# Patient Record
Sex: MALE | Race: WHITE | Employment: FULL TIME | URBAN - NONMETROPOLITAN AREA
[De-identification: names, ages, dates, MRNs, and addresses within clinical notes are randomized per-mention and may not be internally consistent; named-entity substitution may affect disease eponyms.]

---

## 2018-01-20 ENCOUNTER — ANESTHESIA (OUTPATIENT)
Dept: OPERATING ROOM | Age: 38
End: 2018-01-20
Payer: COMMERCIAL

## 2018-01-20 ENCOUNTER — ANESTHESIA EVENT (OUTPATIENT)
Dept: OPERATING ROOM | Age: 38
End: 2018-01-20
Payer: COMMERCIAL

## 2018-01-20 ENCOUNTER — HOSPITAL ENCOUNTER (OUTPATIENT)
Age: 38
Setting detail: OBSERVATION
Discharge: HOME | End: 2018-01-20
Attending: EMERGENCY MEDICINE | Admitting: SURGERY
Payer: COMMERCIAL

## 2018-01-20 ENCOUNTER — APPOINTMENT (OUTPATIENT)
Dept: CT IMAGING | Age: 38
End: 2018-01-20
Payer: COMMERCIAL

## 2018-01-20 VITALS
HEIGHT: 68 IN | OXYGEN SATURATION: 95 % | HEART RATE: 75 BPM | DIASTOLIC BLOOD PRESSURE: 61 MMHG | RESPIRATION RATE: 18 BRPM | SYSTOLIC BLOOD PRESSURE: 102 MMHG | BODY MASS INDEX: 26.52 KG/M2 | TEMPERATURE: 98.6 F | WEIGHT: 175 LBS

## 2018-01-20 VITALS
OXYGEN SATURATION: 94 % | SYSTOLIC BLOOD PRESSURE: 137 MMHG | DIASTOLIC BLOOD PRESSURE: 77 MMHG | RESPIRATION RATE: 6 BRPM

## 2018-01-20 DIAGNOSIS — K35.30 ACUTE APPENDICITIS WITH LOCALIZED PERITONITIS: Primary | ICD-10-CM

## 2018-01-20 PROBLEM — K35.80 ACUTE APPENDICITIS: Status: ACTIVE | Noted: 2018-01-20

## 2018-01-20 LAB
ALBUMIN SERPL-MCNC: 4.4 G/DL (ref 3.5–5.1)
ALP BLD-CCNC: 78 U/L (ref 38–126)
ALT SERPL-CCNC: 43 U/L (ref 11–66)
ANION GAP SERPL CALCULATED.3IONS-SCNC: 16 MEQ/L (ref 8–16)
AST SERPL-CCNC: 21 U/L (ref 5–40)
BACTERIA: NORMAL
BASOPHILS # BLD: 0 %
BASOPHILS ABSOLUTE: 0 THOU/MM3 (ref 0–0.1)
BILIRUB SERPL-MCNC: 1.1 MG/DL (ref 0.3–1.2)
BILIRUBIN URINE: NEGATIVE
BLOOD, URINE: NEGATIVE
BUN BLDV-MCNC: 10 MG/DL (ref 7–22)
CALCIUM SERPL-MCNC: 9.3 MG/DL (ref 8.5–10.5)
CASTS: NORMAL /LPF
CASTS: NORMAL /LPF
CHARACTER, URINE: CLEAR
CHLORIDE BLD-SCNC: 102 MEQ/L (ref 98–111)
CO2: 23 MEQ/L (ref 23–33)
COLOR: YELLOW
CREAT SERPL-MCNC: 1.1 MG/DL (ref 0.4–1.2)
CRYSTALS: NORMAL
EOSINOPHIL # BLD: 0.3 %
EOSINOPHILS ABSOLUTE: 0 THOU/MM3 (ref 0–0.4)
EPITHELIAL CELLS, UA: NORMAL /HPF
GFR SERPL CREATININE-BSD FRML MDRD: 74 ML/MIN/1.73M2
GLUCOSE BLD-MCNC: 141 MG/DL (ref 70–108)
GLUCOSE, URINE: NEGATIVE MG/DL
HCT VFR BLD CALC: 46.6 % (ref 42–52)
HEMOGLOBIN: 15.4 GM/DL (ref 14–18)
KETONES, URINE: NEGATIVE
LEUKOCYTE ESTERASE, URINE: NEGATIVE
LIPASE: 23.8 U/L (ref 5.6–51.3)
LYMPHOCYTES # BLD: 6.7 %
LYMPHOCYTES ABSOLUTE: 1.1 THOU/MM3 (ref 1–4.8)
MCH RBC QN AUTO: 30.1 PG (ref 27–31)
MCHC RBC AUTO-ENTMCNC: 33.1 GM/DL (ref 33–37)
MCV RBC AUTO: 91.2 FL (ref 80–94)
MISCELLANEOUS LAB TEST RESULT: NORMAL
MONOCYTES # BLD: 3.5 %
MONOCYTES ABSOLUTE: 0.5 THOU/MM3 (ref 0.4–1.3)
NITRITE, URINE: NEGATIVE
NUCLEATED RED BLOOD CELLS: 0 /100 WBC
OSMOLALITY CALCULATION: 282.7 MOSMOL/KG (ref 275–300)
PDW BLD-RTO: 14.1 % (ref 11.5–14.5)
PH UA: 5.5
PLATELET # BLD: 212 THOU/MM3 (ref 130–400)
PMV BLD AUTO: 9.7 MCM (ref 7.4–10.4)
POTASSIUM SERPL-SCNC: 4.4 MEQ/L (ref 3.5–5.2)
PROTEIN UA: NEGATIVE MG/DL
RBC # BLD: 5.11 MILL/MM3 (ref 4.7–6.1)
RBC URINE: NORMAL /HPF
RENAL EPITHELIAL, UA: NORMAL
SEG NEUTROPHILS: 89.5 %
SEGMENTED NEUTROPHILS ABSOLUTE COUNT: 14.1 THOU/MM3 (ref 1.8–7.7)
SODIUM BLD-SCNC: 141 MEQ/L (ref 135–145)
SPECIFIC GRAVITY UA: 1.03 (ref 1–1.03)
TOTAL PROTEIN: 6.9 G/DL (ref 6.1–8)
UROBILINOGEN, URINE: 0.2 EU/DL
WBC # BLD: 15.7 THOU/MM3 (ref 4.8–10.8)
WBC UA: NORMAL /HPF
YEAST: NORMAL

## 2018-01-20 PROCEDURE — G0378 HOSPITAL OBSERVATION PER HR: HCPCS

## 2018-01-20 PROCEDURE — 3700000000 HC ANESTHESIA ATTENDED CARE: Performed by: SURGERY

## 2018-01-20 PROCEDURE — 2580000003 HC RX 258: Performed by: EMERGENCY MEDICINE

## 2018-01-20 PROCEDURE — 96376 TX/PRO/DX INJ SAME DRUG ADON: CPT

## 2018-01-20 PROCEDURE — 80053 COMPREHEN METABOLIC PANEL: CPT

## 2018-01-20 PROCEDURE — 6360000002 HC RX W HCPCS: Performed by: EMERGENCY MEDICINE

## 2018-01-20 PROCEDURE — 3600000013 HC SURGERY LEVEL 3 ADDTL 15MIN: Performed by: SURGERY

## 2018-01-20 PROCEDURE — 7100000000 HC PACU RECOVERY - FIRST 15 MIN: Performed by: SURGERY

## 2018-01-20 PROCEDURE — 85025 COMPLETE CBC W/AUTO DIFF WBC: CPT

## 2018-01-20 PROCEDURE — 6370000000 HC RX 637 (ALT 250 FOR IP): Performed by: SURGERY

## 2018-01-20 PROCEDURE — 2580000003 HC RX 258: Performed by: SURGERY

## 2018-01-20 PROCEDURE — 88304 TISSUE EXAM BY PATHOLOGIST: CPT

## 2018-01-20 PROCEDURE — 44970 LAPAROSCOPY APPENDECTOMY: CPT | Performed by: SURGERY

## 2018-01-20 PROCEDURE — 96365 THER/PROPH/DIAG IV INF INIT: CPT

## 2018-01-20 PROCEDURE — 6360000002 HC RX W HCPCS: Performed by: SURGERY

## 2018-01-20 PROCEDURE — 7100000001 HC PACU RECOVERY - ADDTL 15 MIN: Performed by: SURGERY

## 2018-01-20 PROCEDURE — 81001 URINALYSIS AUTO W/SCOPE: CPT

## 2018-01-20 PROCEDURE — 2780000010 HC IMPLANT OTHER: Performed by: SURGERY

## 2018-01-20 PROCEDURE — 96375 TX/PRO/DX INJ NEW DRUG ADDON: CPT

## 2018-01-20 PROCEDURE — 6360000002 HC RX W HCPCS: Performed by: ANESTHESIOLOGY

## 2018-01-20 PROCEDURE — 99285 EMERGENCY DEPT VISIT HI MDM: CPT

## 2018-01-20 PROCEDURE — 2580000003 HC RX 258: Performed by: ANESTHESIOLOGY

## 2018-01-20 PROCEDURE — 83690 ASSAY OF LIPASE: CPT

## 2018-01-20 PROCEDURE — 3600000003 HC SURGERY LEVEL 3 BASE: Performed by: SURGERY

## 2018-01-20 PROCEDURE — 6360000004 HC RX CONTRAST MEDICATION: Performed by: EMERGENCY MEDICINE

## 2018-01-20 PROCEDURE — 2720000010 HC SURG SUPPLY STERILE: Performed by: SURGERY

## 2018-01-20 PROCEDURE — 2500000003 HC RX 250 WO HCPCS: Performed by: SURGERY

## 2018-01-20 PROCEDURE — 2500000003 HC RX 250 WO HCPCS: Performed by: ANESTHESIOLOGY

## 2018-01-20 PROCEDURE — 3700000001 HC ADD 15 MINUTES (ANESTHESIA): Performed by: SURGERY

## 2018-01-20 PROCEDURE — 99219 PR INITIAL OBSERVATION CARE/DAY 50 MINUTES: CPT | Performed by: SURGERY

## 2018-01-20 PROCEDURE — 74177 CT ABD & PELVIS W/CONTRAST: CPT

## 2018-01-20 PROCEDURE — 36415 COLL VENOUS BLD VENIPUNCTURE: CPT

## 2018-01-20 DEVICE — RELOAD STPL SZ 0 L45MM DIA3.5MM 0DEG STD REG TISS BLU TI: Type: IMPLANTABLE DEVICE | Status: FUNCTIONAL

## 2018-01-20 RX ORDER — SODIUM CHLORIDE 0.9 % (FLUSH) 0.9 %
10 SYRINGE (ML) INJECTION PRN
Status: DISCONTINUED | OUTPATIENT
Start: 2018-01-20 | End: 2018-01-20 | Stop reason: HOSPADM

## 2018-01-20 RX ORDER — SODIUM CHLORIDE 9 MG/ML
INJECTION, SOLUTION INTRAVENOUS CONTINUOUS
Status: DISCONTINUED | OUTPATIENT
Start: 2018-01-20 | End: 2018-01-20 | Stop reason: HOSPADM

## 2018-01-20 RX ORDER — BUPIVACAINE HYDROCHLORIDE 5 MG/ML
INJECTION, SOLUTION PERINEURAL PRN
Status: DISCONTINUED | OUTPATIENT
Start: 2018-01-20 | End: 2018-01-20 | Stop reason: HOSPADM

## 2018-01-20 RX ORDER — FENTANYL CITRATE 50 UG/ML
INJECTION, SOLUTION INTRAMUSCULAR; INTRAVENOUS PRN
Status: DISCONTINUED | OUTPATIENT
Start: 2018-01-20 | End: 2018-01-20 | Stop reason: SDUPTHER

## 2018-01-20 RX ORDER — PROPOFOL 10 MG/ML
INJECTION, EMULSION INTRAVENOUS PRN
Status: DISCONTINUED | OUTPATIENT
Start: 2018-01-20 | End: 2018-01-20 | Stop reason: SDUPTHER

## 2018-01-20 RX ORDER — PAROXETINE 10 MG/1
10 TABLET, FILM COATED ORAL EVERY MORNING
COMMUNITY
End: 2020-10-05

## 2018-01-20 RX ORDER — MORPHINE SULFATE 4 MG/ML
4 INJECTION, SOLUTION INTRAMUSCULAR; INTRAVENOUS
Status: DISCONTINUED | OUTPATIENT
Start: 2018-01-20 | End: 2018-01-20 | Stop reason: HOSPADM

## 2018-01-20 RX ORDER — HYDROCODONE BITARTRATE AND ACETAMINOPHEN 5; 325 MG/1; MG/1
1 TABLET ORAL EVERY 4 HOURS PRN
Status: DISCONTINUED | OUTPATIENT
Start: 2018-01-20 | End: 2018-01-20 | Stop reason: HOSPADM

## 2018-01-20 RX ORDER — MORPHINE SULFATE 10 MG/ML
INJECTION, SOLUTION INTRAMUSCULAR; INTRAVENOUS PRN
Status: DISCONTINUED | OUTPATIENT
Start: 2018-01-20 | End: 2018-01-20 | Stop reason: SDUPTHER

## 2018-01-20 RX ORDER — CEFAZOLIN SODIUM 1 G/3ML
INJECTION, POWDER, FOR SOLUTION INTRAMUSCULAR; INTRAVENOUS PRN
Status: DISCONTINUED | OUTPATIENT
Start: 2018-01-20 | End: 2018-01-20 | Stop reason: SDUPTHER

## 2018-01-20 RX ORDER — FENTANYL CITRATE 50 UG/ML
50 INJECTION, SOLUTION INTRAMUSCULAR; INTRAVENOUS EVERY 5 MIN PRN
Status: DISCONTINUED | OUTPATIENT
Start: 2018-01-20 | End: 2018-01-20 | Stop reason: HOSPADM

## 2018-01-20 RX ORDER — SUCCINYLCHOLINE CHLORIDE 20 MG/ML
INJECTION INTRAMUSCULAR; INTRAVENOUS PRN
Status: DISCONTINUED | OUTPATIENT
Start: 2018-01-20 | End: 2018-01-20 | Stop reason: SDUPTHER

## 2018-01-20 RX ORDER — NEOSTIGMINE METHYLSULFATE 1 MG/ML
INJECTION, SOLUTION INTRAVENOUS PRN
Status: DISCONTINUED | OUTPATIENT
Start: 2018-01-20 | End: 2018-01-20 | Stop reason: SDUPTHER

## 2018-01-20 RX ORDER — SODIUM CHLORIDE 0.9 % (FLUSH) 0.9 %
10 SYRINGE (ML) INJECTION EVERY 12 HOURS SCHEDULED
Status: DISCONTINUED | OUTPATIENT
Start: 2018-01-20 | End: 2018-01-20 | Stop reason: HOSPADM

## 2018-01-20 RX ORDER — KETOROLAC TROMETHAMINE 30 MG/ML
30 INJECTION, SOLUTION INTRAMUSCULAR; INTRAVENOUS EVERY 6 HOURS
Status: DISCONTINUED | OUTPATIENT
Start: 2018-01-20 | End: 2018-01-20 | Stop reason: HOSPADM

## 2018-01-20 RX ORDER — ONDANSETRON 2 MG/ML
INJECTION INTRAMUSCULAR; INTRAVENOUS PRN
Status: DISCONTINUED | OUTPATIENT
Start: 2018-01-20 | End: 2018-01-20 | Stop reason: SDUPTHER

## 2018-01-20 RX ORDER — MORPHINE SULFATE 2 MG/ML
2 INJECTION, SOLUTION INTRAMUSCULAR; INTRAVENOUS EVERY 5 MIN PRN
Status: DISCONTINUED | OUTPATIENT
Start: 2018-01-20 | End: 2018-01-20 | Stop reason: HOSPADM

## 2018-01-20 RX ORDER — LABETALOL HYDROCHLORIDE 5 MG/ML
10 INJECTION, SOLUTION INTRAVENOUS EVERY 10 MIN PRN
Status: DISCONTINUED | OUTPATIENT
Start: 2018-01-20 | End: 2018-01-20 | Stop reason: HOSPADM

## 2018-01-20 RX ORDER — ONDANSETRON 2 MG/ML
4 INJECTION INTRAMUSCULAR; INTRAVENOUS EVERY 6 HOURS PRN
Status: DISCONTINUED | OUTPATIENT
Start: 2018-01-20 | End: 2018-01-20 | Stop reason: HOSPADM

## 2018-01-20 RX ORDER — PAROXETINE HYDROCHLORIDE 20 MG/1
10 TABLET, FILM COATED ORAL EVERY MORNING
Status: DISCONTINUED | OUTPATIENT
Start: 2018-01-20 | End: 2018-01-20 | Stop reason: HOSPADM

## 2018-01-20 RX ORDER — ROCURONIUM BROMIDE 10 MG/ML
INJECTION, SOLUTION INTRAVENOUS PRN
Status: DISCONTINUED | OUTPATIENT
Start: 2018-01-20 | End: 2018-01-20 | Stop reason: SDUPTHER

## 2018-01-20 RX ORDER — SODIUM CHLORIDE 9 MG/ML
INJECTION, SOLUTION INTRAVENOUS CONTINUOUS
Status: DISCONTINUED | OUTPATIENT
Start: 2018-01-20 | End: 2018-01-20

## 2018-01-20 RX ORDER — HYDROCODONE BITARTRATE AND ACETAMINOPHEN 5; 325 MG/1; MG/1
1 TABLET ORAL EVERY 4 HOURS PRN
Qty: 42 TABLET | Refills: 0 | Status: SHIPPED | OUTPATIENT
Start: 2018-01-20 | End: 2018-01-27

## 2018-01-20 RX ORDER — GLYCOPYRROLATE 0.2 MG/ML
INJECTION INTRAMUSCULAR; INTRAVENOUS PRN
Status: DISCONTINUED | OUTPATIENT
Start: 2018-01-20 | End: 2018-01-20 | Stop reason: SDUPTHER

## 2018-01-20 RX ORDER — MORPHINE SULFATE 2 MG/ML
2 INJECTION, SOLUTION INTRAMUSCULAR; INTRAVENOUS
Status: DISCONTINUED | OUTPATIENT
Start: 2018-01-20 | End: 2018-01-20 | Stop reason: HOSPADM

## 2018-01-20 RX ORDER — HYDROCODONE BITARTRATE AND ACETAMINOPHEN 5; 325 MG/1; MG/1
2 TABLET ORAL EVERY 4 HOURS PRN
Status: DISCONTINUED | OUTPATIENT
Start: 2018-01-20 | End: 2018-01-20 | Stop reason: HOSPADM

## 2018-01-20 RX ORDER — MIDAZOLAM HYDROCHLORIDE 1 MG/ML
INJECTION INTRAMUSCULAR; INTRAVENOUS PRN
Status: DISCONTINUED | OUTPATIENT
Start: 2018-01-20 | End: 2018-01-20 | Stop reason: SDUPTHER

## 2018-01-20 RX ORDER — SODIUM CHLORIDE 9 MG/ML
INJECTION, SOLUTION INTRAVENOUS CONTINUOUS PRN
Status: DISCONTINUED | OUTPATIENT
Start: 2018-01-20 | End: 2018-01-20 | Stop reason: SDUPTHER

## 2018-01-20 RX ORDER — ACETAMINOPHEN 325 MG/1
650 TABLET ORAL EVERY 4 HOURS PRN
Status: DISCONTINUED | OUTPATIENT
Start: 2018-01-20 | End: 2018-01-20 | Stop reason: HOSPADM

## 2018-01-20 RX ADMIN — ONDANSETRON 4 MG: 2 INJECTION INTRAMUSCULAR; INTRAVENOUS at 06:02

## 2018-01-20 RX ADMIN — KETOROLAC TROMETHAMINE 30 MG: 30 INJECTION, SOLUTION INTRAMUSCULAR at 15:18

## 2018-01-20 RX ADMIN — SODIUM CHLORIDE: 9 INJECTION, SOLUTION INTRAVENOUS at 05:35

## 2018-01-20 RX ADMIN — MORPHINE SULFATE 10 MG: 10 INJECTION, SOLUTION INTRAMUSCULAR; INTRAVENOUS at 06:02

## 2018-01-20 RX ADMIN — CEFAZOLIN SODIUM 1000 MG: 1 INJECTION, POWDER, FOR SOLUTION INTRAMUSCULAR; INTRAVENOUS at 05:56

## 2018-01-20 RX ADMIN — IOPAMIDOL 80 ML: 755 INJECTION, SOLUTION INTRAVENOUS at 01:54

## 2018-01-20 RX ADMIN — SODIUM CHLORIDE: 9 INJECTION, SOLUTION INTRAVENOUS at 08:24

## 2018-01-20 RX ADMIN — NEOSTIGMINE METHYLSULFATE 3 MG: 1 INJECTION, SOLUTION INTRAVENOUS at 06:20

## 2018-01-20 RX ADMIN — Medication 3 G: at 02:50

## 2018-01-20 RX ADMIN — KETOROLAC TROMETHAMINE 30 MG: 30 INJECTION, SOLUTION INTRAMUSCULAR at 09:41

## 2018-01-20 RX ADMIN — CEFOXITIN 2 G: 2 INJECTION, POWDER, FOR SOLUTION INTRAVENOUS at 15:20

## 2018-01-20 RX ADMIN — MIDAZOLAM HYDROCHLORIDE 2 MG: 1 INJECTION, SOLUTION INTRAMUSCULAR; INTRAVENOUS at 05:45

## 2018-01-20 RX ADMIN — SODIUM CHLORIDE: 9 INJECTION, SOLUTION INTRAVENOUS at 04:56

## 2018-01-20 RX ADMIN — SUCCINYLCHOLINE CHLORIDE 140 MG: 20 INJECTION, SOLUTION INTRAMUSCULAR; INTRAVENOUS at 05:47

## 2018-01-20 RX ADMIN — FENTANYL CITRATE 50 MCG: 50 INJECTION, SOLUTION INTRAMUSCULAR; INTRAVENOUS at 07:00

## 2018-01-20 RX ADMIN — PROPOFOL 200 MG: 10 INJECTION, EMULSION INTRAVENOUS at 05:47

## 2018-01-20 RX ADMIN — CEFOXITIN 2 G: 2 INJECTION, POWDER, FOR SOLUTION INTRAVENOUS at 09:41

## 2018-01-20 RX ADMIN — GLYCOPYRROLATE 0.4 MG: 0.2 INJECTION, SOLUTION INTRAMUSCULAR; INTRAVENOUS at 06:20

## 2018-01-20 RX ADMIN — PAROXETINE HYDROCHLORIDE 10 MG: 20 TABLET, FILM COATED ORAL at 09:41

## 2018-01-20 RX ADMIN — FENTANYL CITRATE 100 MCG: 50 INJECTION INTRAMUSCULAR; INTRAVENOUS at 05:45

## 2018-01-20 RX ADMIN — GLYCOPYRROLATE 0.2 MG: 0.2 INJECTION, SOLUTION INTRAMUSCULAR; INTRAVENOUS at 05:45

## 2018-01-20 RX ADMIN — Medication 25 MG: at 05:54

## 2018-01-20 RX ADMIN — FENTANYL CITRATE 50 MCG: 50 INJECTION, SOLUTION INTRAMUSCULAR; INTRAVENOUS at 07:08

## 2018-01-20 ASSESSMENT — PULMONARY FUNCTION TESTS
PIF_VALUE: 26
PIF_VALUE: 6
PIF_VALUE: 11
PIF_VALUE: 2
PIF_VALUE: 16
PIF_VALUE: 2
PIF_VALUE: 3
PIF_VALUE: 19
PIF_VALUE: 13
PIF_VALUE: 12
PIF_VALUE: 33
PIF_VALUE: 18
PIF_VALUE: 13
PIF_VALUE: 31
PIF_VALUE: 19
PIF_VALUE: 12
PIF_VALUE: 4
PIF_VALUE: 1
PIF_VALUE: 19
PIF_VALUE: 13
PIF_VALUE: 7
PIF_VALUE: 3
PIF_VALUE: 14
PIF_VALUE: 16
PIF_VALUE: 15
PIF_VALUE: 1
PIF_VALUE: 1
PIF_VALUE: 18
PIF_VALUE: 14
PIF_VALUE: 12
PIF_VALUE: 11
PIF_VALUE: 24
PIF_VALUE: 10
PIF_VALUE: 19
PIF_VALUE: 3
PIF_VALUE: 13
PIF_VALUE: 5
PIF_VALUE: 14
PIF_VALUE: 12
PIF_VALUE: 11
PIF_VALUE: 26
PIF_VALUE: 19
PIF_VALUE: 1
PIF_VALUE: 14
PIF_VALUE: 28
PIF_VALUE: 25
PIF_VALUE: 19
PIF_VALUE: 12
PIF_VALUE: 11
PIF_VALUE: 15
PIF_VALUE: 20
PIF_VALUE: 19

## 2018-01-20 ASSESSMENT — ENCOUNTER SYMPTOMS
ABDOMINAL PAIN: 1
COUGH: 0
CHEST TIGHTNESS: 0
SINUS PRESSURE: 0
EYE PAIN: 0
DIARRHEA: 0
SORE THROAT: 0
CHOKING: 0
BACK PAIN: 1
EYE REDNESS: 0
SHORTNESS OF BREATH: 0
SINUS PAIN: 0
VOMITING: 0
CONSTIPATION: 0

## 2018-01-20 ASSESSMENT — PAIN DESCRIPTION - PAIN TYPE
TYPE: ACUTE PAIN;SURGICAL PAIN
TYPE: ACUTE PAIN
TYPE: ACUTE PAIN
TYPE: ACUTE PAIN;SURGICAL PAIN

## 2018-01-20 ASSESSMENT — PAIN DESCRIPTION - ORIENTATION
ORIENTATION: LOWER;MID
ORIENTATION: RIGHT;LEFT
ORIENTATION: LOWER
ORIENTATION: LOWER

## 2018-01-20 ASSESSMENT — PAIN DESCRIPTION - DESCRIPTORS
DESCRIPTORS: ACHING
DESCRIPTORS: ACHING
DESCRIPTORS: SHARP
DESCRIPTORS: SHARP

## 2018-01-20 ASSESSMENT — PAIN DESCRIPTION - FREQUENCY
FREQUENCY: CONTINUOUS
FREQUENCY: CONTINUOUS

## 2018-01-20 ASSESSMENT — PAIN SCALES - GENERAL
PAINLEVEL_OUTOF10: 7
PAINLEVEL_OUTOF10: 4
PAINLEVEL_OUTOF10: 6
PAINLEVEL_OUTOF10: 5
PAINLEVEL_OUTOF10: 5
PAINLEVEL_OUTOF10: 4
PAINLEVEL_OUTOF10: 5
PAINLEVEL_OUTOF10: 7

## 2018-01-20 ASSESSMENT — PAIN DESCRIPTION - LOCATION
LOCATION: ABDOMEN

## 2018-01-20 NOTE — H&P
[address] History and Physical Examination  Emily Carrasco MD     Pt Name: Liz Fam  MRN: [id number]  YOB: 1980  Date of evaluation: 1/20/2018  Primary Care Physician: Stuart Houser MD  Patient evaluated at the request of  Dr. Milady Salmeron  Reason for evaluation:abdominal pain  IMPRESSIONS:   1. Acute appendicitis  2. Leukocytosis secondary to #1  3. Incarcerated umbilical hernia  PLANS:   1. Recommend laparoscopic appendectomy And repair of incarcerated umbilical hernia  2. Discussed alternative options potential trial of medical management with antibiotics  3. Patient opts to proceed with surgery. Risks of procedure were discussed and included but not limited to bleeding, infection, conversion open procedure abscess and occult organ injury. Back expressed understanding was to proceed. 4. Broad-spectrum antibiotic preoperatively  SUBJECTIVE:   Chief complaint: Suprapubic abdominal pain    History of present illness: Patient presented to the emergency department with complaints of onset of abdominal pain almost 20 hours ago. It really has not waned but has increased. It is described as suprapubic in location and has some back pain as well. He's had no urinary symptoms. He's had some complaints of pressure with urination. He has no prior history of urinary tract infection or nephrolithiasis. He denies any fever or chills. He's had no emesis but reports some nausea. he has no other significant medical issues. Past Medical History:  History reviewed. No pertinent past medical history. Past Surgical History:  History reviewed. No pertinent surgical history. Medications:  Prior to Admission medications    Medication Sig Start Date End Date Taking?  Authorizing Provider   PARoxetine (PAXIL) 10 MG tablet Take 10 mg by mouth every morning   Yes Historical Provider, MD    Scheduled Meds:   ampicillin-sulbactam  3 g Intravenous Once     Continuous Infusions:   PRN Meds:.  Allergies:  has No Known Allergies. Family History:  family history is not on file. Social History:   reports that he has quit smoking. His smoking use included Cigarettes. He has never used smokeless tobacco. He reports that he drinks alcohol. He reports that he does not use drugs. Review of Systems:  Review of Systems   Constitutional: Positive for fever. Negative for chills and diaphoresis. HENT: Negative for sinus pain, sinus pressure, sneezing and sore throat. Eyes: Negative for pain and redness. Respiratory: Negative for cough, choking, chest tightness and shortness of breath. Cardiovascular: Negative for chest pain and palpitations. Gastrointestinal: Positive for abdominal pain. Negative for constipation, diarrhea and vomiting. Endocrine: Negative for polydipsia and polyuria. Genitourinary: Negative for flank pain, frequency, hematuria and testicular pain. Musculoskeletal: Positive for back pain. Negative for arthralgias and joint swelling. Skin: Negative for pallor and rash. Allergic/Immunologic: Negative for food allergies. Neurological: Negative for light-headedness and headaches. Hematological: Negative for adenopathy. Does not bruise/bleed easily. Psychiatric/Behavioral: Negative for agitation, behavioral problems and confusion. OBJECTIVE:   CURRENT VITALS:  weight is 175 lb (79.4 kg). His oral temperature is 98.1 °F (36.7 °C). His blood pressure is 140/91 (abnormal) and his pulse is 83. His respiration is 16 and oxygen saturation is 97%.    Temperature Range (24h):Temp: 98.1 °F (36.7 °C) Temp  Av.1 °F (36.7 °C)  Min: 98.1 °F (36.7 °C)  Max: 98.1 °F (36.7 °C)  BP Range (41H): Systolic (73FKN), CXZ:876 , Min:133 , VKC:248     Diastolic (32QAG), NXU:88, Min:91, Max:94    Pulse Range (24h): Pulse  Av  Min: 83  Max: 87  Respiration Range (24h): Resp  Av  Min: 16  Max: 16  Current Pulse Ox (24h):  SpO2: 97 %  Pulse Ox Range (24h):  SpO2  Av.5 % Min: 96 %  Max: 97 %  Oxygen Amount and Delivery:    Physical Exam   Constitutional: He is oriented to person, place, and time. He appears well-developed and well-nourished. No distress. HENT:   Head: Normocephalic and atraumatic. Mouth/Throat: No oropharyngeal exudate. Eyes: EOM are normal. Pupils are equal, round, and reactive to light. No scleral icterus. Neck: No JVD present. Cardiovascular: Normal rate and normal heart sounds. Pulmonary/Chest: Effort normal and breath sounds normal. No respiratory distress. He has no wheezes. Abdominal: Soft. He exhibits no distension. There is tenderness in the right lower quadrant and suprapubic area. There is guarding and tenderness at McBurney's point. There is no rebound. A hernia is present. Hernia confirmed positive in the ventral area. Musculoskeletal: Normal range of motion. He exhibits no edema or deformity. Lymphadenopathy:     He has no cervical adenopathy. Neurological: He is alert and oriented to person, place, and time. No cranial nerve deficit. Skin: Skin is warm and dry. No rash noted. He is not diaphoretic. No pallor. Psychiatric: He has a normal mood and affect. His behavior is normal. Thought content normal.   Nursing note and vitals reviewed.         LABS:     Recent Labs      01/20/18   0109  01/20/18   0110   WBC   --   15.7*   HGB   --   15.4   HCT   --   46.6   PLT   --   212   NA   --   141   K   --   4.4   CL   --   102   CO2   --   23   BUN   --   10   CREATININE   --   1.1   CALCIUM   --   9.3   AST   --   21   ALT   --   43   BILITOT   --   1.1   LIPASE   --   23.8   NITRU  NEGATIVE   --    COLORU  YELLOW   --    BACTERIA  NONE   --      CBC with Differential:    Lab Results   Component Value Date    WBC 15.7 01/20/2018    RBC 5.11 01/20/2018    RBC 4.82 12/12/2011    HGB 15.4 01/20/2018    HCT 46.6 01/20/2018     01/20/2018    MCV 91.2 01/20/2018    MCH 30.1 01/20/2018    MCHC 33.1 01/20/2018    RDW 14.1 is no hydronephrosis or stones of either kidney. No renal masses are noted. The urinary bladder is normal.       No abnormalities of the small bowel loops are noted. The colon is within normal limits.        There is enlargement of the appendix with periappendiceal fat stranding. The mid to distal portion of the appendix measures up to 0.7 cm in diameter. There is no abscess. There is no free fluid. There is no free air. The abnormal appearing appendix can    be seen on coronal image 29, and on axial images 53-59 in the right lower quadrant anterior to the right psoas muscle and right iliac vessels.       The IVC and aorta are of normal caliber.        There is no pelvic free fluid.    There is no adenopathy.       No suspicious osseous lesions are identified.           Impression   1.  Findings consistent with acute appendicitis. 2.  No evidence of rupture or abscess at this point.                   **This report has been created using voice recognition software. It may contain minor errors which are inherent in voice recognition technology. **       Final report electronically signed by Dr. Ludie Goodpasture on 1/20/2018            Electronically signed by Shannen Pulliam MD on 1/20/2018 at 2:36 AM

## 2018-01-20 NOTE — DISCHARGE INSTR - ACTIVITY
Continue to be up and walking at home  No heavy lifting over 10-15 pounds  No pushing, pulling to tugging

## 2018-01-20 NOTE — PROGRESS NOTES
Patient admitted to 5e room 59. Alert and oriented x4. INT in right AC. Belongings with patient. Father at bedside.

## 2018-01-20 NOTE — ANESTHESIA PRE PROCEDURE
BP Readings from Last 3 Encounters:   01/20/18 133/89   11/27/15 138/83       NPO Status:                                                                                 BMI:   Wt Readings from Last 3 Encounters:   01/20/18 175 lb (79.4 kg)   11/27/15 175 lb (79.4 kg)     Body mass index is 27 kg/m². CBC:   Lab Results   Component Value Date    WBC 15.7 01/20/2018    RBC 5.11 01/20/2018    RBC 4.82 12/12/2011    HGB 15.4 01/20/2018    HCT 46.6 01/20/2018    MCV 91.2 01/20/2018    RDW 14.1 01/20/2018     01/20/2018       CMP:   Lab Results   Component Value Date     01/20/2018    K 4.4 01/20/2018     01/20/2018    CO2 23 01/20/2018    BUN 10 01/20/2018    CREATININE 1.1 01/20/2018    LABGLOM 74 01/20/2018    GLUCOSE 141 01/20/2018    PROT 6.9 01/20/2018    CALCIUM 9.3 01/20/2018    BILITOT 1.1 01/20/2018    ALKPHOS 78 01/20/2018    AST 21 01/20/2018    ALT 43 01/20/2018       POC Tests: No results for input(s): POCGLU, POCNA, POCK, POCCL, POCBUN, POCHEMO, POCHCT in the last 72 hours. Coags: No results found for: PROTIME, INR, APTT    HCG (If Applicable): No results found for: PREGTESTUR, PREGSERUM, HCG, HCGQUANT     ABGs: No results found for: PHART, PO2ART, WOF9VCP, WRS7DED, BEART, S0LGJKZB     Type & Screen (If Applicable):  No results found for: LABABO, LABRH    Anesthesia Evaluation    Airway: Mallampati: II  TM distance: >3 FB   Neck ROM: full  Mouth opening: > = 3 FB Dental:          Pulmonary: breath sounds clear to auscultation                             Cardiovascular:            Rhythm: regular                      Neuro/Psych:               GI/Hepatic/Renal:             Endo/Other:                     Abdominal:           Vascular:                                        Anesthesia Plan      general     ASA 2 - emergent       Induction: intravenous.     MIPS: Postoperative opioids intended and Prophylactic antiemetics administered. Anesthetic plan and risks discussed with patient and father.                       Erwin Nelson MD   1/20/2018

## 2018-01-20 NOTE — ED NOTES
Patient resting in bed. Respirations easy and unlabored. No distress noted. Call light within reach.         Santa Fernandez RN  01/20/18 8508

## 2018-01-20 NOTE — OP NOTE
[address] [place] [address]                                 OPERATIVE REPORT    PATIENT NAME: Markie Huitron                  :        1980  MED REC NO:   [id number]                           ROOM:       3940  ACCOUNT NO:   [de-identified]                           ADMIT DATE: 2018  PROVIDER:     Alin Tanner M.D.    DATE OF PROCEDURE:  2018    PREPROCEDURE DIAGNOSES:  1. Acute appendicitis. 2.  Incarcerated umbilical hernia. POSTOPERATIVE DIAGNOSES:  1. Acute appendicitis. 2.  Incarcerated umbilical hernia. 3.  Occult left inguinal hernia, visualized on laparoscopy. PROCEDURES:  1. Laparoscopic appendectomy. 2.  Repair of incarcerated umbilical hernia. ANESTHESIA:  General.    SPECIMEN:  Appendix to Pathology. INDICATIONS:  See history and physical examination. DESCRIPTION OF PROCEDURE:  The patient was brought to the operating room  and placed supine on the operating table. He had pneumatic sequential  compression devices on lower extremities. He had been given a broad  spectrum antibiotic in the emergency department. After induction of  general anesthetic per Dr. Fahad Pickett, the abdomen was clipped, prepped, and  draped. Incision was made at the umbilicus in the area of the umbilical  hernia. Fat was dissected away. There was a small fascial defect, this  was incised, enlarged and camera was inserted. CO2 pneumoperitoneum was  introduced followed by the camera. Second 12 mm port was placed  suprapubically above the bladder under direct visualization with  laparoscopy, could see a left inguinal hernia which contained fat. This  was easily reducible. A 5-mm port was then placed in the left lower  quadrant. The patient was turned slightly to the left. The appendix was  elevated upward, it was purulent, but not perforated.   The mesoappendix was  cleared at the appendiceal base and divided

## 2018-01-20 NOTE — ED PROVIDER NOTES
Abdominal CT was completed which showed evidence of acute appendicitis with romelia-appendiceal fat stranding with no evidence of free air or rupture or surrounding abscess. He was given a dose of Unasyn while in ED. Blood cultures pending. Patient will be taken to the OR by Dr. Loren Mendoza for appendectomy. Patient transferred to medical floor in stable condition. CRITICAL CARE:   None    CONSULTS:  Dr. Cassidy Camacho:  None    FINAL IMPRESSION      1. Acute appendicitis with localized peritonitis          DISPOSITION/PLAN     DISPOSITION     PATIENT REFERRED TO:  Isabelle Rubinstein, MD  [address] [place] [address]  [phone]            DISCHARGE MEDICATIONS:  New Prescriptions    No medications on file       (Please note that portions of this note were completed with a voice recognition program.  Efforts were made to edit the dictations but occasionally words are mis-transcribed.)      Scribe: Willi Chapa 1/20/18 1:01 AM Scribing for and in the presence of DO Khadra. Signed by: Rad Cleveland, 01/20/18 2:36 AM    Provider:  I personally performed the services described in the documentation, reviewed and edited the documentation which was dictated to the scribe in my presence, and it accurately records my words and actions.     DO Khadra 1/20/18 2:36 AM             DO Khadra  Resident  01/20/18 8868

## 2018-02-01 ENCOUNTER — OFFICE VISIT (OUTPATIENT)
Dept: SURGERY | Age: 38
End: 2018-02-01

## 2018-02-01 VITALS
RESPIRATION RATE: 18 BRPM | OXYGEN SATURATION: 97 % | TEMPERATURE: 97 F | SYSTOLIC BLOOD PRESSURE: 122 MMHG | WEIGHT: 179 LBS | HEIGHT: 67 IN | DIASTOLIC BLOOD PRESSURE: 80 MMHG | BODY MASS INDEX: 28.09 KG/M2 | HEART RATE: 88 BPM

## 2018-02-01 DIAGNOSIS — Z98.890 POSTOPERATIVE STATE: ICD-10-CM

## 2018-02-01 DIAGNOSIS — K35.30 ACUTE APPENDICITIS WITH LOCALIZED PERITONITIS: Primary | ICD-10-CM

## 2018-02-01 DIAGNOSIS — K40.90 LEFT INGUINAL HERNIA: ICD-10-CM

## 2018-02-01 DIAGNOSIS — K42.0 INCARCERATED UMBILICAL HERNIA: ICD-10-CM

## 2018-02-01 PROCEDURE — 99024 POSTOP FOLLOW-UP VISIT: CPT | Performed by: SURGERY

## 2018-02-01 NOTE — PROGRESS NOTES
patient. Constitutional: Denies any fever, chills, fatigue. Wound: Denies any rash, skin color changes or wound problems. Resp: Denies any cough, shortness of breath. CV: Denies any chest pain, orthopnea or syncope. GI: Positive for incisional discomfort only. Denies any nausea, vomiting, blood in the stool, constipation or diarrhea. : Denies any hematuria, hesitancy or dysuria. OBJECTIVE     VITALS: /80 (Site: Right Arm, Position: Sitting, Cuff Size: Medium Adult)   Pulse 88   Temp 97 °F (36.1 °C) (Tympanic)   Resp 18   Ht 5' 7\" (1.702 m)   Wt 179 lb (81.2 kg)   SpO2 97%   BMI 28.04 kg/m²     CONSTITUTIONAL: Alert and oriented times 3, no acute distress and cooperative to examination. SKIN: Skin color, texture, turgor normal. No rashes or lesions. INCISION: wound margins intact and healing well. No signs of infection. No drainage. LUNGS: Lungs Clear  CARDIOVASCULAR: Normal Rate  ABDOMEN: soft, nontender, nondistended, no masses or organomegaly. NEUROLOGIC: No sensory or motor nerve irritation    Narrative     [facility]     [facility]                [id number]  Assoc.                                              Page 1 of 1  [facility] [address]  [address], [place] [address]                                                      PROC: 2018  [facility]                                    RECV: 2018  [facility]                                    RPTD: 2018  [address], [place] [address]                      MRN:  [id number]      LOC: 5E                      ACCT: [de-identified]  SEX: M                      : 1980  AGE: 42 Y                         PATHOLOGY REPORT                      ATTN: OLIVIA CLARK  [de-identified]                  REQ: OLIVIA CLARK        Clinical Information: APPENDICITIS    FINAL DIAGNOSIS:  Appendix, appendectomy:   Acute appendicitis.   Acute serositis.     Specimen:  APPENDIX      Gross Examination:  The container is labeled Ahmetdarren Osei, appendix.   Received in  formalin is an appendectomy specimen.  The appendix measures 10.5 cm  and has an average diameter of 1.1 cm.    The serosa demonstrates white  discoloration.  The appendix is serially sectioned and reveals a  hemorrhagic lumen.  There are no masses or lesions identified. Representative sections including the proximal margin designated by a  knife maico are submitted in one cassette.  ss.   SIO:lgl    Microscopic Examination:  Microscopic performed. Sagealfonso Fermin                                          <Sign Out Dr. Emir Villar M.D., F.C.A.P.       [facility]/ [address] [address]  Printed on:  1/22/2018  [address]illy [address]  [place], [facility]  Original print date: 01/22/2018

## 2018-02-03 NOTE — DISCHARGE SUMMARY
work: None  Activity: No lifting greater than 15 pounds for 4 weeks  Diet:    as tolerated  Code Status: Prior    Follow-up visits:   Lindsay Alvarez MD  [address] [place] [address]  [phone]      Call office on Monday to schedule follow up appointment for 5-7 days after discharge    Lucie Seals MD  [address] [facility]  [phone]      Call office on Monday to set up appointment for follow up for  2 weeks with Dr Krystyna Rodriguez        Procedures: Laparoscopic appendectomy    Consults:   none    Examination:  Vitals:  Vitals:    01/20/18 0745 01/20/18 0800 01/20/18 1131 01/20/18 1544   BP: 105/66 101/62 (!) 101/59 102/61   Pulse: 87 79 72 75   Resp: 16 16 18 18   Temp: 98 °F (36.7 °C)  98.4 °F (36.9 °C) 98.6 °F (37 °C)   TempSrc: Oral  Oral Oral   SpO2: 97% 97% 94% 95%   Weight:       Height:         Weight: Weight: 175 lb (79.4 kg)     24 hour intake/output:No intake or output data in the 24 hours ending 02/03/18 1019    GSignificant Diagnostics:   Radiology: Ct Abdomen Pelvis W Iv Contrast Additional Contrast? None    Result Date: 1/20/2018  PROCEDURE: CT ABDOMEN PELVIS W IV CONTRAST CLINICAL INFORMATION: abdominal pain . COMPARISON: None. TECHNIQUE: 5 mm axial CT images were obtained through the abdomen and pelvis after the administration of intravenous and oral contrast. Coronal and sagittal reconstructions were obtained. All CT scans at this facility use dose modulation, iterative reconstruction, and/or weight-based dosing when appropriate to reduce radiation dose to as low as reasonably achievable. FINDINGS: The visualized aspects of the lung bases are clear. The base of the heart is within appropriate limits. The liver is normal. The spleen is normal. The adrenals and pancreas are normal. The gallbladder is normal.    There is no hydronephrosis or stones of either kidney. No renal masses are noted.  The urinary bladder is normal. No abnormalities of the small bowel loops are noted. The colon is within normal limits. There is enlargement of the appendix with periappendiceal fat stranding. The mid to distal portion of the appendix measures up to 0.7 cm in diameter. There is no abscess. There is no free fluid. There is no free air. The abnormal appearing appendix can be seen on coronal image 29, and on axial images 53-59 in the right lower quadrant anterior to the right psoas muscle and right iliac vessels. The IVC and aorta are of normal caliber. There is no pelvic free fluid. There is no adenopathy. No suspicious osseous lesions are identified. 1.  Findings consistent with acute appendicitis. 2.  No evidence of rupture or abscess at this point. **This report has been created using voice recognition software. It may contain minor errors which are inherent in voice recognition technology. ** Final report electronically signed by Dr. Reji Patricia on 1/20/2018 2:13 AM      Labs: No results found for this or any previous visit (from the past 72 hour(s)).     Discharge condition: good  Disposition: Home  Time spent on discharge: 20 minutes    Electronically signed by Sameer De La O MD on 2/3/2018 at 10:19 AM

## 2018-03-07 ENCOUNTER — HOSPITAL ENCOUNTER (EMERGENCY)
Age: 38
Discharge: HOME | End: 2018-03-07
Attending: EMERGENCY MEDICINE
Payer: COMMERCIAL

## 2018-03-07 ENCOUNTER — APPOINTMENT (OUTPATIENT)
Dept: GENERAL RADIOLOGY | Age: 38
End: 2018-03-07
Payer: COMMERCIAL

## 2018-03-07 VITALS
WEIGHT: 180 LBS | RESPIRATION RATE: 16 BRPM | BODY MASS INDEX: 28.25 KG/M2 | HEART RATE: 63 BPM | OXYGEN SATURATION: 95 % | DIASTOLIC BLOOD PRESSURE: 89 MMHG | SYSTOLIC BLOOD PRESSURE: 124 MMHG | HEIGHT: 67 IN | TEMPERATURE: 98.1 F

## 2018-03-07 DIAGNOSIS — R07.89 ATYPICAL CHEST PAIN: Primary | ICD-10-CM

## 2018-03-07 LAB
ALBUMIN SERPL-MCNC: 4.6 G/DL (ref 3.5–5.1)
ALP BLD-CCNC: 73 U/L (ref 38–126)
ALT SERPL-CCNC: 51 U/L (ref 11–66)
ANION GAP SERPL CALCULATED.3IONS-SCNC: 15 MEQ/L (ref 8–16)
AST SERPL-CCNC: 30 U/L (ref 5–40)
BASOPHILS # BLD: 0.3 %
BASOPHILS ABSOLUTE: 0 THOU/MM3 (ref 0–0.1)
BILIRUB SERPL-MCNC: 1 MG/DL (ref 0.3–1.2)
BILIRUBIN DIRECT: < 0.2 MG/DL (ref 0–0.3)
BUN BLDV-MCNC: 12 MG/DL (ref 7–22)
CALCIUM SERPL-MCNC: 9.9 MG/DL (ref 8.5–10.5)
CHLORIDE BLD-SCNC: 100 MEQ/L (ref 98–111)
CO2: 25 MEQ/L (ref 23–33)
CREAT SERPL-MCNC: 0.9 MG/DL (ref 0.4–1.2)
EKG ATRIAL RATE: 63 BPM
EKG P AXIS: 40 DEGREES
EKG P-R INTERVAL: 162 MS
EKG Q-T INTERVAL: 392 MS
EKG QRS DURATION: 82 MS
EKG QTC CALCULATION (BAZETT): 401 MS
EKG R AXIS: 47 DEGREES
EKG T AXIS: 24 DEGREES
EKG VENTRICULAR RATE: 63 BPM
EOSINOPHIL # BLD: 2.9 %
EOSINOPHILS ABSOLUTE: 0.1 THOU/MM3 (ref 0–0.4)
GFR SERPL CREATININE-BSD FRML MDRD: > 90 ML/MIN/1.73M2
GLUCOSE BLD-MCNC: 114 MG/DL (ref 70–108)
HCT VFR BLD CALC: 45.4 % (ref 42–52)
HEMOGLOBIN: 15.3 GM/DL (ref 14–18)
LIPASE: 32.9 U/L (ref 5.6–51.3)
LYMPHOCYTES # BLD: 26.7 %
LYMPHOCYTES ABSOLUTE: 1.3 THOU/MM3 (ref 1–4.8)
MCH RBC QN AUTO: 29.6 PG (ref 27–31)
MCHC RBC AUTO-ENTMCNC: 33.6 GM/DL (ref 33–37)
MCV RBC AUTO: 88 FL (ref 80–94)
MONOCYTES # BLD: 6.5 %
MONOCYTES ABSOLUTE: 0.3 THOU/MM3 (ref 0.4–1.3)
NUCLEATED RED BLOOD CELLS: 0 /100 WBC
OSMOLALITY CALCULATION: 280 MOSMOL/KG (ref 275–300)
PDW BLD-RTO: 13.7 % (ref 11.5–14.5)
PLATELET # BLD: 193 THOU/MM3 (ref 130–400)
PMV BLD AUTO: 8.9 FL (ref 7.4–10.4)
POTASSIUM SERPL-SCNC: 4.2 MEQ/L (ref 3.5–5.2)
RBC # BLD: 5.16 MILL/MM3 (ref 4.7–6.1)
SEG NEUTROPHILS: 63.6 %
SEGMENTED NEUTROPHILS ABSOLUTE COUNT: 3 THOU/MM3 (ref 1.8–7.7)
SODIUM BLD-SCNC: 140 MEQ/L (ref 135–145)
TOTAL PROTEIN: 7.2 G/DL (ref 6.1–8)
TROPONIN T: < 0.01 NG/ML
TSH SERPL DL<=0.05 MIU/L-ACNC: 1.93 UIU/ML (ref 0.4–4.2)
WBC # BLD: 4.7 THOU/MM3 (ref 4.8–10.8)

## 2018-03-07 PROCEDURE — 36415 COLL VENOUS BLD VENIPUNCTURE: CPT

## 2018-03-07 PROCEDURE — 93005 ELECTROCARDIOGRAM TRACING: CPT | Performed by: EMERGENCY MEDICINE

## 2018-03-07 PROCEDURE — 93010 ELECTROCARDIOGRAM REPORT: CPT | Performed by: INTERNAL MEDICINE

## 2018-03-07 PROCEDURE — 84484 ASSAY OF TROPONIN QUANT: CPT

## 2018-03-07 PROCEDURE — 82248 BILIRUBIN DIRECT: CPT

## 2018-03-07 PROCEDURE — 71046 X-RAY EXAM CHEST 2 VIEWS: CPT

## 2018-03-07 PROCEDURE — 83690 ASSAY OF LIPASE: CPT

## 2018-03-07 PROCEDURE — 84443 ASSAY THYROID STIM HORMONE: CPT

## 2018-03-07 PROCEDURE — 99285 EMERGENCY DEPT VISIT HI MDM: CPT

## 2018-03-07 PROCEDURE — 80053 COMPREHEN METABOLIC PANEL: CPT

## 2018-03-07 PROCEDURE — 85025 COMPLETE CBC W/AUTO DIFF WBC: CPT

## 2018-03-07 ASSESSMENT — ENCOUNTER SYMPTOMS
FACIAL SWELLING: 0
COLOR CHANGE: 0
DIARRHEA: 0
EYE DISCHARGE: 0
PHOTOPHOBIA: 0
EYE REDNESS: 0
SHORTNESS OF BREATH: 1
STRIDOR: 0
RHINORRHEA: 0
ABDOMINAL DISTENTION: 0
COUGH: 0
VOMITING: 0
NAUSEA: 0

## 2018-03-07 ASSESSMENT — PAIN DESCRIPTION - DESCRIPTORS: DESCRIPTORS: PRESSURE;ACHING

## 2018-03-07 ASSESSMENT — PAIN DESCRIPTION - ORIENTATION: ORIENTATION: LEFT

## 2018-03-07 ASSESSMENT — PAIN SCALES - GENERAL: PAINLEVEL_OUTOF10: 5

## 2018-03-07 ASSESSMENT — PAIN DESCRIPTION - LOCATION: LOCATION: CHEST

## 2018-03-07 ASSESSMENT — PAIN DESCRIPTION - PAIN TYPE: TYPE: ACUTE PAIN

## 2018-03-07 NOTE — ED PROVIDER NOTES
Discharge Medication List as of 3/7/2018 12:23 PM      CONTINUE these medications which have NOT CHANGED    Details   PARoxetine (PAXIL) 10 MG tablet Take 10 mg by mouth every morningHistorical Med           ALLERGIES     has No Known Allergies. FAMILY HISTORY     indicated that the status of his mother is unknown. He indicated that the status of his father is unknown.    family history includes Diabetes in his mother; Stroke in his father. SOCIAL HISTORY      reports that he has quit smoking. His smoking use included Cigarettes. He has never used smokeless tobacco. He reports that he drinks alcohol. He reports that he does not use drugs. PHYSICAL EXAM     INITIAL VITALS:  height is 5' 7\" (1.702 m) and weight is 180 lb (81.6 kg). His oral temperature is 98.1 °F (36.7 °C). His blood pressure is 124/89 and his pulse is 63. His respiration is 16 and oxygen saturation is 95%. Physical Exam   Constitutional: He is oriented to person, place, and time. He appears well-developed and well-nourished. HENT:   Head: Normocephalic and atraumatic. Eyes: Conjunctivae are normal. Right eye exhibits no discharge. Left eye exhibits no discharge. No scleral icterus. Neck: Normal range of motion. No JVD present. Cardiovascular: Normal rate, regular rhythm and normal heart sounds. Exam reveals no gallop and no friction rub. No murmur heard. Pulmonary/Chest: Effort normal and breath sounds normal. No stridor. He has no decreased breath sounds. He has no wheezes. He has no rhonchi. He has no rales. Non reproducible chest wall tenderness   Abdominal: Soft. There is no tenderness. There is no rebound and no guarding. Musculoskeletal: Normal range of motion. He exhibits no edema. Neurological: He is alert and oriented to person, place, and time. GCS eye subscore is 4. GCS verbal subscore is 5. GCS motor subscore is 6. Skin: Skin is warm and dry. No rash (on exposed body surfaces) noted.  He is not diaphoretic. Psychiatric: He has a normal mood and affect. His behavior is normal. Thought content normal.     DIFFERENTIAL DIAGNOSIS:   Angina, atypical chest pain, anxiety, ACS    DIAGNOSTIC RESULTS     EKG: All EKG's are interpreted by the Emergency Department Physician who either signs or Co-signs this chart in the absence of a cardiologist.  Calleen Bosworth. Rate: 63 bpm  VA interval: 162 ms  QRS duration: 82 ms  QTc: 401 ms  P-R-T axes: 40, 47, 24  Normal sinus rhythm  No STEMI    RADIOLOGY: non-plain film images(s) such as CT, Ultrasound and MRI are read by the radiologist.    XR CHEST STANDARD (2 VW)   Final Result      No acute cardiopulmonary disease. **This report has been created using voice recognition software. It may contain minor errors which are inherent in voice recognition technology. **      Final report electronically signed by Dr. Sebastien Rowe on 3/7/2018 12:03 PM        LABS:   Labs Reviewed   CBC WITH AUTO DIFFERENTIAL - Abnormal; Notable for the following:        Result Value    WBC 4.7 (*)     Monocytes # 0.3 (*)     All other components within normal limits   BASIC METABOLIC PANEL - Abnormal; Notable for the following:     Glucose 114 (*)     All other components within normal limits   HEPATIC FUNCTION PANEL   LIPASE   TROPONIN   TSH WITHOUT REFLEX   ANION GAP   GLOMERULAR FILTRATION RATE, ESTIMATED   OSMOLALITY   URINE DRUG SCREEN   URINE RT REFLEX TO CULTURE       EMERGENCY DEPARTMENT COURSE:   Vitals:    Vitals:    03/07/18 1114 03/07/18 1218   BP: (!) 137/105 124/89   Pulse: 75 63   Resp: 12 16   Temp: 98.1 °F (36.7 °C)    TempSrc: Oral    SpO2: 98% 95%   Weight: 180 lb (81.6 kg)    Height: 5' 7\" (1.702 m)      11:36 AM: The patient was seen and evaluated. 12:24 PM: Discussed results, diagnosis and plan with the patient. Questions were addressed. Disposition and follow-up were agreed upon.  Specific discharge instructions explained, including reasons to return to the emergency department. MDM:  The patient presents today with chest pain onset yesterday with associated shortness of breath. The patient's vitals are stable and exam is benign. EKG is normal and chest Xray shows no acute findings. The patient's labs show nothing acute, troponin is normal. He is discharged home in stable condition with instructions to return with new or worsening symptoms. CRITICAL CARE:   None     CONSULTS:  None    PROCEDURES:  None     FINAL IMPRESSION      1. Atypical chest pain          DISPOSITION/PLAN   Patient was discharged in stable condition. Will return if symptoms change or worsen, or for any sign or symptom deemed emergent by the patient or family members. Follow up as an outpatient, sooner if symptoms warrant. PATIENT REFERRED TO:  Linn Wolf MD  [address] [place] [address]  [phone]            DISCHARGE MEDICATIONS:  Discharge Medication List as of 3/7/2018 12:23 PM          (Please note that portions of this note were completed with a voice recognition program.  Efforts were made to edit the dictations but occasionally words are mis-transcribed.)    Scribe:  Arleth Carbajal 3/7/18 11:36 AM Scribing for and in the presence of Sarai Ramirez DO. Signed by: Rad Birch, 03/08/18 10:16 PM    Provider:  I personally performed the services described in the documentation, reviewed and edited the documentation which was dictated to the scribe in my presence, and it accurately records my words and actions.     Sarai Ramirez DO 3/7/18 10:16 PM                        Saari Ramirez DO  03/08/18 9942

## 2018-06-19 ENCOUNTER — HOSPITAL ENCOUNTER (EMERGENCY)
Age: 38
Discharge: HOME | End: 2018-06-19
Payer: COMMERCIAL

## 2018-06-19 VITALS
HEIGHT: 68 IN | TEMPERATURE: 98.4 F | WEIGHT: 175 LBS | BODY MASS INDEX: 26.52 KG/M2 | DIASTOLIC BLOOD PRESSURE: 88 MMHG | RESPIRATION RATE: 16 BRPM | OXYGEN SATURATION: 97 % | HEART RATE: 88 BPM | SYSTOLIC BLOOD PRESSURE: 130 MMHG

## 2018-06-19 DIAGNOSIS — J03.90 ACUTE TONSILLITIS, UNSPECIFIED ETIOLOGY: Primary | ICD-10-CM

## 2018-06-19 PROCEDURE — 99212 OFFICE O/P EST SF 10 MIN: CPT

## 2018-06-19 PROCEDURE — 99213 OFFICE O/P EST LOW 20 MIN: CPT | Performed by: NURSE PRACTITIONER

## 2018-06-19 RX ORDER — AMOXICILLIN 500 MG/1
500 CAPSULE ORAL 2 TIMES DAILY
Qty: 20 CAPSULE | Refills: 0 | Status: SHIPPED | OUTPATIENT
Start: 2018-06-19 | End: 2018-06-29

## 2018-06-19 ASSESSMENT — PAIN SCALES - GENERAL: PAINLEVEL_OUTOF10: 5

## 2018-06-19 ASSESSMENT — ENCOUNTER SYMPTOMS
NAUSEA: 0
SORE THROAT: 1
SINUS PRESSURE: 0
DIARRHEA: 0
VOMITING: 0
SHORTNESS OF BREATH: 0
COLOR CHANGE: 0
RHINORRHEA: 0

## 2018-06-19 ASSESSMENT — PAIN DESCRIPTION - DESCRIPTORS: DESCRIPTORS: SORE

## 2018-06-19 ASSESSMENT — PAIN DESCRIPTION - PAIN TYPE: TYPE: ACUTE PAIN

## 2018-06-19 ASSESSMENT — PAIN DESCRIPTION - LOCATION: LOCATION: THROAT

## 2018-11-12 ENCOUNTER — OFFICE VISIT (OUTPATIENT)
Dept: SURGERY | Age: 38
End: 2018-11-12
Payer: COMMERCIAL

## 2018-11-12 VITALS
SYSTOLIC BLOOD PRESSURE: 112 MMHG | WEIGHT: 187.2 LBS | RESPIRATION RATE: 18 BRPM | HEIGHT: 67 IN | BODY MASS INDEX: 29.38 KG/M2 | DIASTOLIC BLOOD PRESSURE: 80 MMHG | HEART RATE: 80 BPM | TEMPERATURE: 97 F | OXYGEN SATURATION: 95 %

## 2018-11-12 DIAGNOSIS — K40.90 LEFT INGUINAL HERNIA: Primary | ICD-10-CM

## 2018-11-12 PROCEDURE — 99214 OFFICE O/P EST MOD 30 MIN: CPT | Performed by: SURGERY

## 2018-11-12 RX ORDER — M-VIT,TX,IRON,MINS/CALC/FOLIC 27MG-0.4MG
1 TABLET ORAL DAILY
COMMUNITY

## 2018-11-13 ASSESSMENT — ENCOUNTER SYMPTOMS
BLOOD IN STOOL: 0
TROUBLE SWALLOWING: 0
ABDOMINAL PAIN: 0
COLOR CHANGE: 0
NAUSEA: 0
SORE THROAT: 0
VOMITING: 0
WHEEZING: 0
SHORTNESS OF BREATH: 0
VOICE CHANGE: 0
COUGH: 0

## 2018-12-19 ENCOUNTER — HOSPITAL ENCOUNTER (OUTPATIENT)
Age: 38
Setting detail: OUTPATIENT SURGERY
Discharge: HOME | End: 2018-12-19
Attending: SURGERY | Admitting: SURGERY
Payer: COMMERCIAL

## 2018-12-19 ENCOUNTER — ANESTHESIA EVENT (OUTPATIENT)
Dept: OPERATING ROOM | Age: 38
End: 2018-12-19
Payer: COMMERCIAL

## 2018-12-19 ENCOUNTER — ANESTHESIA (OUTPATIENT)
Dept: OPERATING ROOM | Age: 38
End: 2018-12-19
Payer: COMMERCIAL

## 2018-12-19 VITALS
TEMPERATURE: 97.2 F | SYSTOLIC BLOOD PRESSURE: 97 MMHG | DIASTOLIC BLOOD PRESSURE: 61 MMHG | OXYGEN SATURATION: 99 % | RESPIRATION RATE: 19 BRPM

## 2018-12-19 VITALS
BODY MASS INDEX: 29.29 KG/M2 | WEIGHT: 186.6 LBS | HEART RATE: 71 BPM | TEMPERATURE: 98.8 F | OXYGEN SATURATION: 94 % | DIASTOLIC BLOOD PRESSURE: 79 MMHG | SYSTOLIC BLOOD PRESSURE: 125 MMHG | HEIGHT: 67 IN | RESPIRATION RATE: 16 BRPM

## 2018-12-19 DIAGNOSIS — K40.90 LEFT INGUINAL HERNIA: Primary | ICD-10-CM

## 2018-12-19 PROCEDURE — 88304 TISSUE EXAM BY PATHOLOGIST: CPT

## 2018-12-19 PROCEDURE — 2500000003 HC RX 250 WO HCPCS

## 2018-12-19 PROCEDURE — 3700000000 HC ANESTHESIA ATTENDED CARE: Performed by: SURGERY

## 2018-12-19 PROCEDURE — 3700000001 HC ADD 15 MINUTES (ANESTHESIA): Performed by: SURGERY

## 2018-12-19 PROCEDURE — 6360000002 HC RX W HCPCS

## 2018-12-19 PROCEDURE — 99999 PR OFFICE/OUTPT VISIT,PROCEDURE ONLY: CPT | Performed by: UROLOGY

## 2018-12-19 PROCEDURE — 7100000011 HC PHASE II RECOVERY - ADDTL 15 MIN: Performed by: SURGERY

## 2018-12-19 PROCEDURE — 2500000003 HC RX 250 WO HCPCS: Performed by: SURGERY

## 2018-12-19 PROCEDURE — 7100000000 HC PACU RECOVERY - FIRST 15 MIN: Performed by: SURGERY

## 2018-12-19 PROCEDURE — 99244 OFF/OP CNSLTJ NEW/EST MOD 40: CPT | Performed by: UROLOGY

## 2018-12-19 PROCEDURE — 6360000002 HC RX W HCPCS: Performed by: SURGERY

## 2018-12-19 PROCEDURE — 3600000009 HC SURGERY ROBOT BASE: Performed by: SURGERY

## 2018-12-19 PROCEDURE — 2580000003 HC RX 258: Performed by: SURGERY

## 2018-12-19 PROCEDURE — 7100000001 HC PACU RECOVERY - ADDTL 15 MIN: Performed by: SURGERY

## 2018-12-19 PROCEDURE — 6370000000 HC RX 637 (ALT 250 FOR IP): Performed by: SURGERY

## 2018-12-19 PROCEDURE — 7100000010 HC PHASE II RECOVERY - FIRST 15 MIN: Performed by: SURGERY

## 2018-12-19 PROCEDURE — C1781 MESH (IMPLANTABLE): HCPCS | Performed by: SURGERY

## 2018-12-19 PROCEDURE — S2900 ROBOTIC SURGICAL SYSTEM: HCPCS | Performed by: SURGERY

## 2018-12-19 PROCEDURE — 3600000019 HC SURGERY ROBOT ADDTL 15MIN: Performed by: SURGERY

## 2018-12-19 PROCEDURE — 49329 UNLSTD LAPS PX ABD PERTM&OMN: CPT | Performed by: UROLOGY

## 2018-12-19 PROCEDURE — 44238 UNLISTED LAPS PX INTESTINE: CPT | Performed by: SURGERY

## 2018-12-19 PROCEDURE — 49650 LAP ING HERNIA REPAIR INIT: CPT | Performed by: SURGERY

## 2018-12-19 PROCEDURE — 2709999900 HC NON-CHARGEABLE SUPPLY: Performed by: SURGERY

## 2018-12-19 DEVICE — MESH HERN W10XL15CM POLY POLYLACTIC ACID 70% CLLGN 30% GLYC: Type: IMPLANTABLE DEVICE | Status: FUNCTIONAL

## 2018-12-19 RX ORDER — ACETAMINOPHEN 650 MG/1
650 SUPPOSITORY RECTAL EVERY 4 HOURS PRN
Status: DISCONTINUED | OUTPATIENT
Start: 2018-12-19 | End: 2018-12-19 | Stop reason: HOSPADM

## 2018-12-19 RX ORDER — ONDANSETRON 2 MG/ML
4 INJECTION INTRAMUSCULAR; INTRAVENOUS EVERY 6 HOURS PRN
Status: DISCONTINUED | OUTPATIENT
Start: 2018-12-19 | End: 2018-12-19 | Stop reason: HOSPADM

## 2018-12-19 RX ORDER — FENTANYL CITRATE 50 UG/ML
INJECTION, SOLUTION INTRAMUSCULAR; INTRAVENOUS PRN
Status: DISCONTINUED | OUTPATIENT
Start: 2018-12-19 | End: 2018-12-19 | Stop reason: SDUPTHER

## 2018-12-19 RX ORDER — MORPHINE SULFATE 2 MG/ML
4 INJECTION, SOLUTION INTRAMUSCULAR; INTRAVENOUS
Status: DISCONTINUED | OUTPATIENT
Start: 2018-12-19 | End: 2018-12-19 | Stop reason: HOSPADM

## 2018-12-19 RX ORDER — HYDROCODONE BITARTRATE AND ACETAMINOPHEN 5; 325 MG/1; MG/1
1 TABLET ORAL EVERY 4 HOURS PRN
Status: DISCONTINUED | OUTPATIENT
Start: 2018-12-19 | End: 2018-12-19 | Stop reason: HOSPADM

## 2018-12-19 RX ORDER — SODIUM CHLORIDE 9 MG/ML
INJECTION, SOLUTION INTRAVENOUS CONTINUOUS
Status: DISCONTINUED | OUTPATIENT
Start: 2018-12-19 | End: 2018-12-19 | Stop reason: HOSPADM

## 2018-12-19 RX ORDER — SODIUM CHLORIDE 0.9 % (FLUSH) 0.9 %
10 SYRINGE (ML) INJECTION PRN
Status: DISCONTINUED | OUTPATIENT
Start: 2018-12-19 | End: 2018-12-19 | Stop reason: HOSPADM

## 2018-12-19 RX ORDER — MIDAZOLAM HYDROCHLORIDE 1 MG/ML
INJECTION INTRAMUSCULAR; INTRAVENOUS PRN
Status: DISCONTINUED | OUTPATIENT
Start: 2018-12-19 | End: 2018-12-19 | Stop reason: SDUPTHER

## 2018-12-19 RX ORDER — PROPOFOL 10 MG/ML
INJECTION, EMULSION INTRAVENOUS PRN
Status: DISCONTINUED | OUTPATIENT
Start: 2018-12-19 | End: 2018-12-19 | Stop reason: SDUPTHER

## 2018-12-19 RX ORDER — ACETAMINOPHEN 325 MG/1
650 TABLET ORAL EVERY 4 HOURS PRN
Status: DISCONTINUED | OUTPATIENT
Start: 2018-12-19 | End: 2018-12-19 | Stop reason: HOSPADM

## 2018-12-19 RX ORDER — MORPHINE SULFATE 2 MG/ML
2 INJECTION, SOLUTION INTRAMUSCULAR; INTRAVENOUS
Status: DISCONTINUED | OUTPATIENT
Start: 2018-12-19 | End: 2018-12-19 | Stop reason: HOSPADM

## 2018-12-19 RX ORDER — KETOROLAC TROMETHAMINE 30 MG/ML
INJECTION, SOLUTION INTRAMUSCULAR; INTRAVENOUS PRN
Status: DISCONTINUED | OUTPATIENT
Start: 2018-12-19 | End: 2018-12-19 | Stop reason: SDUPTHER

## 2018-12-19 RX ORDER — DEXAMETHASONE SODIUM PHOSPHATE 4 MG/ML
INJECTION, SOLUTION INTRA-ARTICULAR; INTRALESIONAL; INTRAMUSCULAR; INTRAVENOUS; SOFT TISSUE PRN
Status: DISCONTINUED | OUTPATIENT
Start: 2018-12-19 | End: 2018-12-19 | Stop reason: SDUPTHER

## 2018-12-19 RX ORDER — SODIUM CHLORIDE 0.9 % (FLUSH) 0.9 %
10 SYRINGE (ML) INJECTION EVERY 12 HOURS SCHEDULED
Status: DISCONTINUED | OUTPATIENT
Start: 2018-12-19 | End: 2018-12-19 | Stop reason: HOSPADM

## 2018-12-19 RX ORDER — HYDROCODONE BITARTRATE AND ACETAMINOPHEN 5; 325 MG/1; MG/1
1 TABLET ORAL EVERY 4 HOURS PRN
Qty: 32 TABLET | Refills: 0 | Status: SHIPPED | OUTPATIENT
Start: 2018-12-19 | End: 2018-12-26

## 2018-12-19 RX ORDER — ONDANSETRON 2 MG/ML
INJECTION INTRAMUSCULAR; INTRAVENOUS PRN
Status: DISCONTINUED | OUTPATIENT
Start: 2018-12-19 | End: 2018-12-19 | Stop reason: SDUPTHER

## 2018-12-19 RX ORDER — NEOSTIGMINE METHYLSULFATE 5 MG/5 ML
SYRINGE (ML) INTRAVENOUS PRN
Status: DISCONTINUED | OUTPATIENT
Start: 2018-12-19 | End: 2018-12-19 | Stop reason: SDUPTHER

## 2018-12-19 RX ORDER — LIDOCAINE HYDROCHLORIDE 20 MG/ML
INJECTION, SOLUTION INFILTRATION; PERINEURAL PRN
Status: DISCONTINUED | OUTPATIENT
Start: 2018-12-19 | End: 2018-12-19 | Stop reason: SDUPTHER

## 2018-12-19 RX ORDER — GLYCOPYRROLATE 1 MG/5 ML
SYRINGE (ML) INTRAVENOUS PRN
Status: DISCONTINUED | OUTPATIENT
Start: 2018-12-19 | End: 2018-12-19 | Stop reason: SDUPTHER

## 2018-12-19 RX ORDER — BUPIVACAINE HYDROCHLORIDE AND EPINEPHRINE 5; 5 MG/ML; UG/ML
INJECTION, SOLUTION EPIDURAL; INTRACAUDAL; PERINEURAL PRN
Status: DISCONTINUED | OUTPATIENT
Start: 2018-12-19 | End: 2018-12-19 | Stop reason: HOSPADM

## 2018-12-19 RX ORDER — HYDROCODONE BITARTRATE AND ACETAMINOPHEN 5; 325 MG/1; MG/1
2 TABLET ORAL EVERY 4 HOURS PRN
Status: DISCONTINUED | OUTPATIENT
Start: 2018-12-19 | End: 2018-12-19 | Stop reason: HOSPADM

## 2018-12-19 RX ORDER — ROCURONIUM BROMIDE 10 MG/ML
INJECTION, SOLUTION INTRAVENOUS PRN
Status: DISCONTINUED | OUTPATIENT
Start: 2018-12-19 | End: 2018-12-19 | Stop reason: SDUPTHER

## 2018-12-19 RX ADMIN — HYDROCODONE BITARTRATE AND ACETAMINOPHEN 1 TABLET: 5; 325 TABLET ORAL at 13:05

## 2018-12-19 RX ADMIN — SODIUM CHLORIDE: 9 INJECTION, SOLUTION INTRAVENOUS at 10:12

## 2018-12-19 RX ADMIN — HYDROCODONE BITARTRATE AND ACETAMINOPHEN 1 TABLET: 5; 325 TABLET ORAL at 14:06

## 2018-12-19 RX ADMIN — CEFAZOLIN 2 G: 1 INJECTION, POWDER, FOR SOLUTION INTRAMUSCULAR; INTRAVENOUS; PARENTERAL at 10:26

## 2018-12-19 RX ADMIN — Medication 0.8 MG: at 11:29

## 2018-12-19 RX ADMIN — Medication 5 MG: at 11:28

## 2018-12-19 RX ADMIN — DEXAMETHASONE SODIUM PHOSPHATE 8 MG: 4 INJECTION, SOLUTION INTRAMUSCULAR; INTRAVENOUS at 10:38

## 2018-12-19 RX ADMIN — SODIUM CHLORIDE: 9 INJECTION, SOLUTION INTRAVENOUS at 11:20

## 2018-12-19 RX ADMIN — FENTANYL CITRATE 100 MCG: 50 INJECTION INTRAMUSCULAR; INTRAVENOUS at 10:26

## 2018-12-19 RX ADMIN — ROCURONIUM BROMIDE 40 MG: 10 INJECTION INTRAVENOUS at 10:30

## 2018-12-19 RX ADMIN — PROPOFOL 200 MG: 10 INJECTION, EMULSION INTRAVENOUS at 10:28

## 2018-12-19 RX ADMIN — FENTANYL CITRATE 50 MCG: 50 INJECTION INTRAMUSCULAR; INTRAVENOUS at 11:19

## 2018-12-19 RX ADMIN — FENTANYL CITRATE 100 MCG: 50 INJECTION INTRAMUSCULAR; INTRAVENOUS at 10:35

## 2018-12-19 RX ADMIN — MIDAZOLAM HYDROCHLORIDE 2 MG: 1 INJECTION, SOLUTION INTRAMUSCULAR; INTRAVENOUS at 10:25

## 2018-12-19 RX ADMIN — LIDOCAINE HYDROCHLORIDE 80 MG: 20 INJECTION, SOLUTION INFILTRATION; PERINEURAL at 10:28

## 2018-12-19 RX ADMIN — KETOROLAC TROMETHAMINE 30 MG: 30 INJECTION, SOLUTION INTRAMUSCULAR; INTRAVENOUS at 10:38

## 2018-12-19 RX ADMIN — Medication 0.2 MG: at 10:46

## 2018-12-19 RX ADMIN — ONDANSETRON HYDROCHLORIDE 4 MG: 4 INJECTION, SOLUTION INTRAMUSCULAR; INTRAVENOUS at 10:38

## 2018-12-19 ASSESSMENT — PULMONARY FUNCTION TESTS
PIF_VALUE: 21
PIF_VALUE: 25
PIF_VALUE: 25
PIF_VALUE: 27
PIF_VALUE: 24
PIF_VALUE: 18
PIF_VALUE: 21
PIF_VALUE: 25
PIF_VALUE: 21
PIF_VALUE: 27
PIF_VALUE: 15
PIF_VALUE: 27
PIF_VALUE: 28
PIF_VALUE: 25
PIF_VALUE: 21
PIF_VALUE: 23
PIF_VALUE: 27
PIF_VALUE: 25
PIF_VALUE: 27
PIF_VALUE: 23
PIF_VALUE: 23
PIF_VALUE: 27
PIF_VALUE: 25
PIF_VALUE: 0
PIF_VALUE: 0
PIF_VALUE: 21
PIF_VALUE: 26
PIF_VALUE: 17
PIF_VALUE: 25
PIF_VALUE: 27
PIF_VALUE: 26
PIF_VALUE: 21
PIF_VALUE: 23
PIF_VALUE: 1
PIF_VALUE: 18
PIF_VALUE: 27
PIF_VALUE: 25
PIF_VALUE: 28
PIF_VALUE: 28
PIF_VALUE: 18
PIF_VALUE: 21
PIF_VALUE: 27
PIF_VALUE: 25
PIF_VALUE: 24
PIF_VALUE: 27
PIF_VALUE: 26
PIF_VALUE: 34
PIF_VALUE: 18
PIF_VALUE: 25
PIF_VALUE: 8
PIF_VALUE: 25
PIF_VALUE: 17
PIF_VALUE: 23
PIF_VALUE: 8
PIF_VALUE: 27
PIF_VALUE: 23
PIF_VALUE: 25
PIF_VALUE: 21
PIF_VALUE: 25
PIF_VALUE: 25
PIF_VALUE: 19
PIF_VALUE: 25

## 2018-12-19 ASSESSMENT — PAIN SCALES - GENERAL
PAINLEVEL_OUTOF10: 0
PAINLEVEL_OUTOF10: 0
PAINLEVEL_OUTOF10: 2
PAINLEVEL_OUTOF10: 7
PAINLEVEL_OUTOF10: 5
PAINLEVEL_OUTOF10: 5

## 2018-12-19 ASSESSMENT — PAIN - FUNCTIONAL ASSESSMENT: PAIN_FUNCTIONAL_ASSESSMENT: 0-10

## 2018-12-19 NOTE — BRIEF OP NOTE
Brief Postoperative Note  ______________________________________________________________    Patient: Lisa Frost  YOB: 1980  MRN: [id number]  Date of Procedure: 12/19/2018    Pre-Op Diagnosis: LEFT INGUINAL HERNIA    Post-Op Diagnosis: Same       Procedure(s):  ROBOTIC LEFT INGUINAL HERNIA REPAIR WITH MESH  Removal infarcted epiploic appendage  Anesthesia: General    Surgeon(s):  MD Minor Moreno MD    Assistant: Magui Steiner    Estimated Blood Loss (mL): less than 50     Complications: None    Specimens:   ID Type Source Tests Collected by Time Destination   A : EPIPLOIC APPENDAGITIS Tissue Appendix 1 Mt Edwina Banks MD 12/19/2018 1115        Implants:    Implant Name Type Inv.  Item Serial No.  Lot No. LRB No. Used   MESH PROGRIP LAPSCP Mesh MESH PROGRIP LAPSCP   COVIDIEN  SURGICAL   Left 1         Drains:      Findings:     Reese Walker MD  Date: 12/19/2018  Time: 11:36 AM

## 2018-12-19 NOTE — PROGRESS NOTES
Pt and family oriented to SDS 11 and unit. Fall band applied. Vaccine information given. Plan of care reviewed.

## 2018-12-20 ENCOUNTER — TELEPHONE (OUTPATIENT)
Dept: SURGERY | Age: 38
End: 2018-12-20

## 2018-12-20 NOTE — OP NOTE
[address] [place] [address]                                OPERATIVE REPORT    PATIENT NAME: Tarik Kumar                  :        1980  MED REC NO:   [id number]                           ROOM:  ACCOUNT NO:   [de-identified]                           ADMIT DATE: 2018  PROVIDER:     Brennan Brown M.D.    DATE OF PROCEDURE:  2018    PREOPERATIVE DIAGNOSIS:  Left inguinal hernia. POSTOPERATIVE DIAGNOSES:  1. Left inguinal hernia. 2.  Infarcted sigmoid colon epiploic appendage. OPERATION PERFORMED:  1.  Robotic-assisted microscopic repair of left inguinal hernia with  ProGrip mesh. 2.  Excision of sigmoid infarcted epiploic appendage. ANESTHESIA:  General.    ESTIMATED BLOOD LOSS:  30 mL or less. SPECIMENS:  Infarcted epiploic appendage to Pathology. INDICATIONS:  The patient is a 70-year-old male seen previously for  acute appendicitis. He developed left groin pain and his hernia was  seen at the time of his appendectomy. It was not symptomatic. He  represented having symptoms of pain in the left groin. Surgical options  were discussed. He opted for robotic-assisted laparoscopic approach. OPERATIVE PROCEDURE:  The patient was brought to the operating room. He  was placed supine on the operating table with pneumatic sequential  compression devices on the lower extremities. He was administered  general anesthetic via endotracheal intubation. Abdomen was clipped,  prepped, and draped. A timeout was performed. Ancef was given. Incision was made below the umbilicus through his old incision, carried  down to the fascia which was elevated and incised. An 8-mm [employer]  port was inserted and CO2 pneumoperitoneum was introduced. Camera was  inserted. Upon inserting the camera, there was a maroonish-colored  fleshy tissue on the back side of the bladder with the sigmoid colon  stuck to it.

## 2019-01-07 ENCOUNTER — OFFICE VISIT (OUTPATIENT)
Dept: SURGERY | Age: 39
End: 2019-01-07

## 2019-01-07 VITALS
RESPIRATION RATE: 18 BRPM | BODY MASS INDEX: 29.79 KG/M2 | WEIGHT: 189.8 LBS | HEIGHT: 67 IN | TEMPERATURE: 96.7 F | OXYGEN SATURATION: 96 % | HEART RATE: 80 BPM | SYSTOLIC BLOOD PRESSURE: 112 MMHG | DIASTOLIC BLOOD PRESSURE: 70 MMHG

## 2019-01-07 DIAGNOSIS — K40.90 LEFT INGUINAL HERNIA: Primary | ICD-10-CM

## 2019-01-07 DIAGNOSIS — Z98.890 POSTOPERATIVE STATE: ICD-10-CM

## 2019-01-07 PROCEDURE — 99024 POSTOP FOLLOW-UP VISIT: CPT | Performed by: SURGERY

## 2020-01-04 ENCOUNTER — HOSPITAL ENCOUNTER (EMERGENCY)
Age: 40
Discharge: HOME | End: 2020-01-04
Payer: COMMERCIAL

## 2020-01-04 VITALS
SYSTOLIC BLOOD PRESSURE: 146 MMHG | WEIGHT: 189 LBS | BODY MASS INDEX: 29.66 KG/M2 | OXYGEN SATURATION: 97 % | HEIGHT: 67 IN | DIASTOLIC BLOOD PRESSURE: 92 MMHG | HEART RATE: 80 BPM | RESPIRATION RATE: 16 BRPM | TEMPERATURE: 97.9 F

## 2020-01-04 PROCEDURE — 99213 OFFICE O/P EST LOW 20 MIN: CPT | Performed by: NURSE PRACTITIONER

## 2020-01-04 PROCEDURE — 99212 OFFICE O/P EST SF 10 MIN: CPT

## 2020-01-04 RX ORDER — PREDNISONE 10 MG/1
TABLET ORAL
Qty: 14 TABLET | Refills: 0 | Status: SHIPPED | OUTPATIENT
Start: 2020-01-04 | End: 2020-07-15 | Stop reason: ALTCHOICE

## 2020-01-04 RX ORDER — AZITHROMYCIN 250 MG/1
TABLET, FILM COATED ORAL
Qty: 6 TABLET | Refills: 0 | Status: SHIPPED | OUTPATIENT
Start: 2020-01-04 | End: 2020-07-15 | Stop reason: ALTCHOICE

## 2020-01-04 ASSESSMENT — ENCOUNTER SYMPTOMS
WHEEZING: 0
EYE PAIN: 0
EYE ITCHING: 0
SORE THROAT: 1
SINUS PAIN: 1
SINUS PRESSURE: 1
SHORTNESS OF BREATH: 0
COUGH: 1
STRIDOR: 0
EYE REDNESS: 0
EYE DISCHARGE: 0

## 2020-01-04 ASSESSMENT — PAIN DESCRIPTION - DESCRIPTORS: DESCRIPTORS: ACHING;SORE

## 2020-01-04 ASSESSMENT — PAIN SCALES - GENERAL: PAINLEVEL_OUTOF10: 6

## 2020-01-04 ASSESSMENT — PAIN DESCRIPTION - PROGRESSION: CLINICAL_PROGRESSION: NOT CHANGED

## 2020-01-04 ASSESSMENT — PAIN DESCRIPTION - PAIN TYPE: TYPE: ACUTE PAIN

## 2020-01-04 ASSESSMENT — PAIN DESCRIPTION - FREQUENCY: FREQUENCY: CONTINUOUS

## 2020-01-04 ASSESSMENT — PAIN DESCRIPTION - LOCATION: LOCATION: THROAT

## 2020-01-04 NOTE — ED PROVIDER NOTES
[place] [address]  Urgent Care Encounter       CHIEF COMPLAINT       Chief Complaint   Patient presents with    Pharyngitis     x 2 days  feverish bady aches since the 31st, back to work on the 2nd and now has sore throat. Nurses Notes reviewed and I agree except as noted in the HPI. HISTORY OF PRESENT ILLNESS   Anh Pulido is a 37 y.o. male who presents     Patient states that for the last 4 to 5 days he has had sinus congestion, with congestive cough. He states within the last 2 days he is also developed a sore throat, and chills. He denies checking for any fevers. Patient has been trying over-the-counter Tylenol and DayQuil neither which seem to be very helpful. He is concerned as he does have a 3month-old at home and does not want to spread any infectious disease to child. Patient is a former smoker, denies any history of diabetes or asthma. REVIEW OF SYSTEMS     Review of Systems   Constitutional: Negative for chills, fatigue and fever. HENT: Positive for congestion, postnasal drip, sinus pressure, sinus pain and sore throat. Eyes: Negative for pain, discharge, redness, itching and visual disturbance. Respiratory: Positive for cough. Negative for shortness of breath, wheezing and stridor. Cardiovascular: Negative for chest pain. Musculoskeletal: Negative for neck pain and neck stiffness. Skin: Negative for rash. Allergic/Immunologic: Negative for environmental allergies, food allergies and immunocompromised state. Neurological: Negative for dizziness, weakness, light-headedness, numbness and headaches. PAST MEDICAL HISTORY         Diagnosis Date    Anxiety        SURGICALHISTORY     Patient  has a past surgical history that includes pr lap,appendectomy (N/A, 1/20/2018) and hernia repair (Left, 12/19/2018).     CURRENT MEDICATIONS       Discharge Medication List as of 1/4/2020  8:38 AM      CONTINUE these medications which have NOT CHANGED    Details   Multiple Vitamins-Minerals (THERAPEUTIC MULTIVITAMIN-MINERALS) tablet Take 1 tablet by mouth dailyHistorical Med      PARoxetine (PAXIL) 10 MG tablet Take 10 mg by mouth every morningHistorical Med             ALLERGIES     Patient is has No Known Allergies. Patients   There is no immunization history on file for this patient. FAMILY HISTORY     Patient's family history includes Diabetes in his mother; Rk Sages in his paternal grandmother; Stroke in his father. SOCIAL HISTORY     Patient  reports that he has quit smoking. His smoking use included cigarettes. He has never used smokeless tobacco. He reports current alcohol use. He reports that he does not use drugs. PHYSICAL EXAM     ED TRIAGE VITALS  BP: (!) 146/92, Temp: 97.9 °F (36.6 °C), Pulse: 80, Resp: 16, SpO2: 97 %,Estimated body mass index is 29.6 kg/m² as calculated from the following:    Height as of this encounter: 5' 7\" (1.702 m). Weight as of this encounter: 189 lb (85.7 kg). ,No LMP for male patient. Physical Exam  Constitutional:       General: He is not in acute distress. Appearance: Normal appearance. He is not ill-appearing, toxic-appearing or diaphoretic. HENT:      Nose: Congestion present. No rhinorrhea. Mouth/Throat:      Mouth: Mucous membranes are moist.      Pharynx: No oropharyngeal exudate or posterior oropharyngeal erythema. Cardiovascular:      Rate and Rhythm: Normal rate. Heart sounds: Normal heart sounds. No murmur. No friction rub. No gallop. Pulmonary:      Effort: Pulmonary effort is normal. No respiratory distress. Breath sounds: Normal breath sounds. No stridor. No wheezing, rhonchi or rales. Chest:      Chest wall: No tenderness. Musculoskeletal: Normal range of motion. Skin:     General: Skin is warm. Findings: No erythema or rash. Neurological:      General: No focal deficit present.       Mental Status: He is alert and oriented to person, place, and time.      Sensory: No sensory deficit. Psychiatric:         Mood and Affect: Mood normal.         Behavior: Behavior normal.         Thought Content: Thought content normal.         Judgment: Judgment normal.         DIAGNOSTIC RESULTS     Labs:No results found for this visit on 01/04/20. IMAGING:    No orders to display     URGENT CARE COURSE:     Vitals:    01/04/20 0818   BP: (!) 146/92   Pulse: 80   Resp: 16   Temp: 97.9 °F (36.6 °C)   TempSrc: Temporal   SpO2: 97%   Weight: 189 lb (85.7 kg)   Height: 5' 7\" (1.702 m)       Medications - No data to display         PROCEDURES:  None    FINAL IMPRESSION      1. Upper respiratory tract infection, unspecified type    2. Acute pharyngitis, unspecified etiology    3. Acute sinusitis, recurrence not specified, unspecified location    4. Cough          DISPOSITION/ PLAN   Patient is discharged home with prescription for azithromycin as well as prednisone, given that his symptoms have been lingering and somewhat worsening within the last 5 days. Discussed with patient that he should continue over-the-counter Tylenol for any fevers. Patient should continue proper hand hygiene and covering cough when around infants or children, as viral and bacterial infections are commonly contagious. Patient should follow-up with his primary care provider within the next 4 to 5 days if symptoms do not seem to be improving.         PATIENT REFERRED TO:  Dick Noble MD  [address] [place] [address] / [facility] [id number]      DISCHARGE MEDICATIONS:  Discharge Medication List as of 1/4/2020  8:38 AM      START taking these medications    Details   azithromycin (ZITHROMAX) 250 MG tablet 500 mg for day 1, 250 mg for days 2-5, Disp-6 tablet, R-0Print      predniSONE (DELTASONE) 10 MG tablet 40 mg for days 1 & 2, 20 mg for days 3 & 4, 10 mg for days 5 & 6, Disp-14 tablet, R-0Print             Discharge Medication List as of 1/4/2020  8:38 AM          Discharge Medication List as of

## 2020-07-15 ENCOUNTER — HOSPITAL ENCOUNTER (EMERGENCY)
Age: 40
Discharge: HOME | End: 2020-07-15
Attending: EMERGENCY MEDICINE
Payer: COMMERCIAL

## 2020-07-15 ENCOUNTER — APPOINTMENT (OUTPATIENT)
Dept: GENERAL RADIOLOGY | Age: 40
End: 2020-07-15
Payer: COMMERCIAL

## 2020-07-15 VITALS
DIASTOLIC BLOOD PRESSURE: 85 MMHG | SYSTOLIC BLOOD PRESSURE: 114 MMHG | TEMPERATURE: 97.8 F | RESPIRATION RATE: 18 BRPM | HEIGHT: 68 IN | OXYGEN SATURATION: 97 % | WEIGHT: 180 LBS | BODY MASS INDEX: 27.28 KG/M2 | HEART RATE: 60 BPM

## 2020-07-15 LAB
ANION GAP SERPL CALCULATED.3IONS-SCNC: 11 MEQ/L (ref 8–16)
BASOPHILS # BLD: 0.7 %
BASOPHILS ABSOLUTE: 0.1 THOU/MM3 (ref 0–0.1)
BUN BLDV-MCNC: 16 MG/DL (ref 7–22)
CALCIUM SERPL-MCNC: 9.5 MG/DL (ref 8.5–10.5)
CHLORIDE BLD-SCNC: 103 MEQ/L (ref 98–111)
CO2: 24 MEQ/L (ref 23–33)
CREAT SERPL-MCNC: 0.9 MG/DL (ref 0.4–1.2)
EKG ATRIAL RATE: 62 BPM
EKG P AXIS: 33 DEGREES
EKG P-R INTERVAL: 166 MS
EKG Q-T INTERVAL: 394 MS
EKG QRS DURATION: 88 MS
EKG QTC CALCULATION (BAZETT): 399 MS
EKG R AXIS: 29 DEGREES
EKG T AXIS: 25 DEGREES
EKG VENTRICULAR RATE: 62 BPM
EOSINOPHIL # BLD: 2.3 %
EOSINOPHILS ABSOLUTE: 0.2 THOU/MM3 (ref 0–0.4)
ERYTHROCYTE [DISTWIDTH] IN BLOOD BY AUTOMATED COUNT: 12.8 % (ref 11.5–14.5)
ERYTHROCYTE [DISTWIDTH] IN BLOOD BY AUTOMATED COUNT: 42.9 FL (ref 35–45)
GFR SERPL CREATININE-BSD FRML MDRD: > 90 ML/MIN/1.73M2
GLUCOSE BLD-MCNC: 98 MG/DL (ref 70–108)
HCT VFR BLD CALC: 47.6 % (ref 42–52)
HEMOGLOBIN: 15.8 GM/DL (ref 14–18)
IMMATURE GRANS (ABS): 0.01 THOU/MM3 (ref 0–0.07)
IMMATURE GRANULOCYTES: 0.1 %
LYMPHOCYTES # BLD: 19.2 %
LYMPHOCYTES ABSOLUTE: 1.4 THOU/MM3 (ref 1–4.8)
MCH RBC QN AUTO: 30.4 PG (ref 26–33)
MCHC RBC AUTO-ENTMCNC: 33.2 GM/DL (ref 32.2–35.5)
MCV RBC AUTO: 91.5 FL (ref 80–94)
MONOCYTES # BLD: 7.3 %
MONOCYTES ABSOLUTE: 0.5 THOU/MM3 (ref 0.4–1.3)
NUCLEATED RED BLOOD CELLS: 0 /100 WBC
OSMOLALITY CALCULATION: 276.8 MOSMOL/KG (ref 275–300)
PLATELET # BLD: 208 THOU/MM3 (ref 130–400)
PMV BLD AUTO: 10.7 FL (ref 9.4–12.4)
POTASSIUM REFLEX MAGNESIUM: 4.3 MEQ/L (ref 3.5–5.2)
RBC # BLD: 5.2 MILL/MM3 (ref 4.7–6.1)
SEG NEUTROPHILS: 70.4 %
SEGMENTED NEUTROPHILS ABSOLUTE COUNT: 5.1 THOU/MM3 (ref 1.8–7.7)
SODIUM BLD-SCNC: 138 MEQ/L (ref 135–145)
TROPONIN T: < 0.01 NG/ML
WBC # BLD: 7.3 THOU/MM3 (ref 4.8–10.8)

## 2020-07-15 PROCEDURE — 80048 BASIC METABOLIC PNL TOTAL CA: CPT

## 2020-07-15 PROCEDURE — 93010 ELECTROCARDIOGRAM REPORT: CPT | Performed by: NUCLEAR MEDICINE

## 2020-07-15 PROCEDURE — 71045 X-RAY EXAM CHEST 1 VIEW: CPT

## 2020-07-15 PROCEDURE — 36415 COLL VENOUS BLD VENIPUNCTURE: CPT

## 2020-07-15 PROCEDURE — 84484 ASSAY OF TROPONIN QUANT: CPT

## 2020-07-15 PROCEDURE — 93005 ELECTROCARDIOGRAM TRACING: CPT | Performed by: NURSE PRACTITIONER

## 2020-07-15 PROCEDURE — 99281 EMR DPT VST MAYX REQ PHY/QHP: CPT

## 2020-07-15 PROCEDURE — 85025 COMPLETE CBC W/AUTO DIFF WBC: CPT

## 2020-07-15 PROCEDURE — 99215 OFFICE O/P EST HI 40 MIN: CPT

## 2020-07-15 ASSESSMENT — ENCOUNTER SYMPTOMS
VOMITING: 0
SORE THROAT: 0
COUGH: 0
SHORTNESS OF BREATH: 1
NAUSEA: 0

## 2020-07-15 ASSESSMENT — PAIN DESCRIPTION - LOCATION: LOCATION: CHEST

## 2020-07-15 ASSESSMENT — PAIN DESCRIPTION - ORIENTATION: ORIENTATION: LEFT

## 2020-07-15 NOTE — ED PROVIDER NOTES
[facility]  Urgent Care Encounter       CHIEF COMPLAINT       Chief Complaint   Patient presents with    Chest Pain       Nurses Notes reviewed and I agree except as noted in the HPI. HISTORY OF PRESENT ILLNESS   Carine Reyes is a 37 y.o. male who presents for evaluation of mid chest pressure and shortness of breath. Patient states that this began last night, however has continued throughout today. He states that he also feels fatigued. He denies any significant cardiac history, however states that he has not seen his primary care provider in \"a long time\". Patient does report that his father had a stroke at the age of 48. He states that he has had a symptoms like this in the past roughly 7 years ago, and a work-up was negative at that time. He denies having taken any medications for this. Patient denies any recent long travel, injury, history of blood clots or any current cigarette smoking. The history is provided by the patient. REVIEW OF SYSTEMS     Review of Systems   Constitutional: Positive for fatigue. Negative for chills and fever. HENT: Negative for congestion and sore throat. Respiratory: Positive for shortness of breath. Negative for cough. Cardiovascular: Positive for chest pain. Gastrointestinal: Negative for nausea and vomiting. Musculoskeletal: Negative for arthralgias and myalgias. Skin: Negative for rash. Allergic/Immunologic: Negative for environmental allergies. Neurological: Negative for headaches. PAST MEDICAL HISTORY         Diagnosis Date    Anxiety        SURGICALHISTORY     Patient  has a past surgical history that includes pr lap,appendectomy (N/A, 1/20/2018) and hernia repair (Left, 12/19/2018).     CURRENT MEDICATIONS       Previous Medications    MULTIPLE VITAMINS-MINERALS (THERAPEUTIC MULTIVITAMIN-MINERALS) TABLET    Take 1 tablet by mouth daily    PAROXETINE (PAXIL) 10 MG TABLET    Take 10 mg by mouth every morning       ALLERGIES     Patient is has No Known Allergies. Patients   There is no immunization history on file for this patient. FAMILY HISTORY     Patient's family history includes Diabetes in his mother; Farwell Bustard in his paternal grandmother; Stroke in his father. SOCIAL HISTORY     Patient  reports that he has quit smoking. His smoking use included cigarettes. He has never used smokeless tobacco. He reports current alcohol use. He reports that he does not use drugs. PHYSICAL EXAM     ED TRIAGE VITALS  BP: 117/84, Temp: 97.8 °F (36.6 °C), Pulse: 67, Resp: 16, SpO2: 98 %,Estimated body mass index is 29.6 kg/m² as calculated from the following:    Height as of 1/4/20: 5' 7\" (1.702 m). Weight as of 1/4/20: 189 lb (85.7 kg). ,No LMP for male patient. Physical Exam  Vitals signs and nursing note reviewed. Constitutional:       General: He is not in acute distress. Appearance: He is well-developed. He is not diaphoretic. Eyes:      Conjunctiva/sclera:      Right eye: Right conjunctiva is not injected. Left eye: Left conjunctiva is not injected. Pupils: Pupils are equal.   Neck:      Musculoskeletal: Normal range of motion. Cardiovascular:      Rate and Rhythm: Normal rate and regular rhythm. Heart sounds: No murmur. Pulmonary:      Effort: Pulmonary effort is normal. No respiratory distress. Breath sounds: Normal breath sounds. Musculoskeletal:      Right knee: He exhibits normal range of motion. Left knee: He exhibits normal range of motion. Right lower leg: No edema. Left lower leg: No edema. Skin:     General: Skin is warm. Findings: No rash. Neurological:      Mental Status: He is alert and oriented to person, place, and time.    Psychiatric:         Behavior: Behavior normal.         DIAGNOSTIC RESULTS     Labs:  Results for orders placed or performed during the hospital encounter of 07/15/20   EKG 12 Lead   Result Value Ref Range Ventricular Rate 62 BPM    Atrial Rate 62 BPM    P-R Interval 166 ms    QRS Duration 88 ms    Q-T Interval 394 ms    QTc Calculation (Bazett) 399 ms    P Axis 33 degrees    R Axis 29 degrees    T Axis 25 degrees       IMAGING:    No orders to display         EKG:  Normal sinus rhythm    URGENT CARE COURSE:     Vitals:    07/15/20 1012   BP: 117/84   Pulse: 67   Resp: 16   Temp: 97.8 °F (36.6 °C)   TempSrc: Temporal   SpO2: 98%       Medications - No data to display         PROCEDURES:  None    FINAL IMPRESSION      1. Chest pain, unspecified type          DISPOSITION/ PLAN       Discussed with the patient that based on his symptoms, I believe transfer to the emergency department for further cardiac evaluation would be necessary at this time. I did discuss the plan to 324 milligrams of aspirin to the patient in the urgent care, however he declined stating that he will take his own on his way to the ER. Patient declines ambulance transport and states that he would prefer to go to [facility] by private car. Patient is transported via his wife. Report was called to Michelle Montero 19 RN.     PATIENT REFERRED TO:  Hansel Alex MD  [address] / [facility] [address]      DISCHARGE MEDICATIONS:  New Prescriptions    No medications on file       Discontinued Medications    AZITHROMYCIN (ZITHROMAX) 250 MG TABLET    500 mg for day 1, 250 mg for days 2-5    PREDNISONE (DELTASONE) 10 MG TABLET    40 mg for days 1 & 2, 20 mg for days 3 & 4, 10 mg for days 5 & 6       Current Discharge Medication List          Lacie Falter, APRN - CNP    (Please note that portions of this note were completed with a voice recognition program. Efforts were made to edit the dictations but occasionally words are mis-transcribed.)          GAYLE Soto CNP  07/15/20 1030

## 2020-07-15 NOTE — ED PROVIDER NOTES
[facility] EMERGENCY DEPT      CHIEF COMPLAINT       Chief Complaint   Patient presents with    Chest Pain       Nurses Notes reviewed and I agree except as noted in the HPI. HISTORY OF PRESENT ILLNESS    Mehreen Pitts is a 37 y.o. male who presents with complaint of chest pain, left chest wall, intermittent for the past 7 years. He's never had a stress test.  Chest pain seems to come on whenever he is stressed out, no exertional chest pain reported. Onset: Acute on chronic  Duration: Started intermittently yesterday this time around  Timing: Gradually improving  Location of Pain: Left chest wall  Intesity/severity: Moderate when present  Modifying Factors: Stress level  Relieved by;  Previous Episodes; Tx Before arrival: None  REVIEW OF SYSTEMS      Review of Systems   Constitutional: Negative for fever, chills, diaphoresis and fatigue. HENT: Negative for congestion, drooling, facial swelling and sore throat. Eyes: Negative for photophobia, pain and discharge. Respiratory: Negative for cough, shortness of breath, wheezing and stridor. Cardiovascular: Positive for chest pain; negative for palpitations and leg swelling. Gastrointestinal: Negative for abdominal pain, blood in stool and abdominal distention. Genitourinary: Negative for dysuria, urgency, hematuria and difficulty urinating. Musculoskeletal: Negative for gait problem, neck pain and neck stiffness. Skin; No rash, No itching  Neurological: Negative for seizures, weakness and numbness. Hematological: Negative for adenopathy. Does not bruise/bleed easily. Psychiatric/Behavioral: Negative for hallucinations, confusion and agitation. Positive for history of anxiety/depression. PAST MEDICAL HISTORY    has a past medical history of Anxiety. SURGICAL HISTORY      has a past surgical history that includes pr lap,appendectomy (N/A, 1/20/2018) and hernia repair (Left, 12/19/2018).     CURRENT MEDICATIONS       Previous Medications    MULTIPLE VITAMINS-MINERALS (THERAPEUTIC MULTIVITAMIN-MINERALS) TABLET    Take 1 tablet by mouth daily    PAROXETINE (PAXIL) 10 MG TABLET    Take 10 mg by mouth every morning       ALLERGIES     has No Known Allergies. FAMILY HISTORY     He indicated that his mother is alive. He indicated that his father is alive. He indicated that both of his sisters are alive. He indicated that his maternal grandmother is . He indicated that his maternal grandfather is . He indicated that his paternal grandmother is . He indicated that his paternal grandfather is . family history includes Diabetes in his mother; Mathur Magdaleno in his paternal grandmother; Stroke in his father. SOCIAL HISTORY      reports that he has quit smoking. His smoking use included cigarettes. He has never used smokeless tobacco. He reports current alcohol use. He reports that he does not use drugs. PHYSICAL EXAM     INITIAL VITALS:  height is 5' 8\" (1.727 m) and weight is 180 lb (81.6 kg). His temporal temperature is 97.8 °F (36.6 °C). His blood pressure is 134/96 (abnormal) and his pulse is 57. His respiration is 18 and oxygen saturation is 96%. Physical Exam   Constitutional:  well-developed and well-nourished. HENT: Head: Normocephalic, atraumatic, Bilateral external ears normal, Oropharynx mosit, No oral exudates, Nose normal.   Eyes: PERRL, EOMI, Conjunctiva normal, No discharge. No scleral icterus  Neck: Normal range of motion, No tenderness, Supple  Cardiovascular: Normal rate, regular rhythm, S1 normal and S2 normal.  Exam reveals no gallop. Pulmonary/Chest: Effort normal and breath sounds normal. No accessory muscle usage or stridor. No respiratory distress. no wheezes. has no rales. exhibits no tenderness. Abdominal: Soft. Bowel sounds are normal.  exhibits no distension. There is no tenderness. There is no rebound and no guarding.    Extremities: No edema, no tenderness, no cyanosis, no clubbing. Musculoskeletal: Good range of motion in major joints is observed. No major deformities noted. Neurological: Alert and oriented ×3, normal motor function, normal sensory function, no focal deficits. GCS 15  Skin: Skin is warm, dry and intact. No rash noted. No erythema. Psychiatric: Affect normal, judgment normal, mood normal.  DIFFERENTIAL DIAGNOSIS:       DIAGNOSTIC RESULTS     EKG: All EKG's are interpreted by the Emergency Department Physician who either signs or Co-signs this chart in the absence of a cardiologist.      RADIOLOGY: non-plain film images(s) such as CT, Ultrasound and MRI are read by the radiologist.  Plain radiographic images are visualized and preliminarily interpreted by the emergency physician unless otherwise stated below. LABS:   Labs Reviewed   CBC WITH AUTO DIFFERENTIAL   BASIC METABOLIC PANEL W/ REFLEX TO MG FOR LOW K   TROPONIN       EMERGENCY DEPARTMENT COURSE:   Vitals:    Vitals:    07/15/20 1012 07/15/20 1027 07/15/20 1116   BP: 117/84  (!) 134/96   Pulse: 67  57   Resp: 16  18   Temp: 97.8 °F (36.6 °C)     TempSrc: Temporal     SpO2: 98%  96%   Weight:  180 lb (81.6 kg)    Height:  5' 8\" (1.727 m)      Patient presenting with complaint of left chest wall pain, stress related. Initial EKG nonacute. CRITICAL CARE:       CONSULTS:  None    PROCEDURES:  None    FINAL IMPRESSION      1.  Chest pain, unspecified type          DISPOSITION/PLAN   Decision To Transfer    PATIENT REFERRED TO:  [facility] EMERGENCY DEPT  [place] [id number] [phone]  Go today        DISCHARGE MEDICATIONS:  New Prescriptions    No medications on file       (Please note that portions of this note were completed with a voice recognition program.  Efforts were made to edit the dictations but occasionally words are mis-transcribed.)    Tucker Jennings, [address] [place] [address], DO  07/17/20 0749

## 2020-07-15 NOTE — ED NOTES
Presents with c/o left sided chest pain starting yesterday. Ambulated to room 11 with steady gait. St pain is lessened with belching. Denies SOB at this time, but has had intermittent SOB. C/O fatigue, denies n/v, dizziness. 12 lead EKG completed upon arrival, Ralph RICHARDS in to evaluate pt. Wife at bedside.      Kimmy Oscar RN  07/15/20 1024

## 2020-07-15 NOTE — ED NOTES
Pt offered and encouraged to take 4 baby asa, but declines, st will take own asa.      Angelito Mccarty RN  07/15/20 1024

## 2020-07-15 NOTE — ED TRIAGE NOTES
Pt presents to  12 from urgent care c/o intermittent chest pain. Pt states pain is tolerable at this time. VSS. Family at bedside. Updated on POC. Call light in reach. Will monitor.

## 2020-07-16 ENCOUNTER — OFFICE VISIT (OUTPATIENT)
Dept: CARDIOLOGY CLINIC | Age: 40
End: 2020-07-16
Payer: COMMERCIAL

## 2020-07-16 ENCOUNTER — TELEPHONE (OUTPATIENT)
Dept: CARDIOLOGY CLINIC | Age: 40
End: 2020-07-16

## 2020-07-16 VITALS
BODY MASS INDEX: 27.77 KG/M2 | HEART RATE: 68 BPM | DIASTOLIC BLOOD PRESSURE: 80 MMHG | SYSTOLIC BLOOD PRESSURE: 122 MMHG | WEIGHT: 183.2 LBS | HEIGHT: 68 IN

## 2020-07-16 PROCEDURE — 99204 OFFICE O/P NEW MOD 45 MIN: CPT | Performed by: NUCLEAR MEDICINE

## 2020-07-16 ASSESSMENT — ENCOUNTER SYMPTOMS
PHOTOPHOBIA: 0
VOMITING: 0
SHORTNESS OF BREATH: 1
ANAL BLEEDING: 0
DIARRHEA: 0
BACK PAIN: 0
CHEST TIGHTNESS: 1
CONSTIPATION: 0
BLOOD IN STOOL: 0
NAUSEA: 0
ABDOMINAL PAIN: 0
ABDOMINAL DISTENTION: 0
FACIAL SWELLING: 0
RECTAL PAIN: 0

## 2020-07-16 NOTE — PROGRESS NOTES
[address] [address]  Dept: [phone]  Dept Fax: [phone]  Loc: [phone]    Visit Date: 7/16/2020    Rod Ruth is a 37 y.o. male who presents todayfor:  Chief Complaint   Patient presents with    New Patient     chest discomfort    Establish Cardiologist    Shortness of Breath    Chest Pain   here for the first time  looks like he has had chest pain intermittently for several month  Got bad the last couple days  No specific triggers  Not exertional in nature  No radiation   Middle chest to the left   Heavy feeling type  Lasts several minutes  Some associated dyspnea  Some tines more difficult to take a breat   Feels back to normal   He is X smoker  No known Dm   No known HTN , borderline HTN  Borderline hyperlipidemia  No CAD before  Family history of CAD   Father with CAD         HPI:  HPI  Past Medical History:   Diagnosis Date    Anxiety       Past Surgical History:   Procedure Laterality Date    HERNIA REPAIR Left 12/19/2018    ROBOTIC LEFT INGUINAL HERNIA REPAIR WITH MESH performed by Justina Hawk MD at [address] N/A 1/20/2018    APPENDECTOMY LAPAROSCOPIC performed by Justina Hawk MD at [facility] History   Problem Relation Age of Onset    Diabetes Mother     Stroke Father     Lung Cancer Paternal Grandmother      Social History     Tobacco Use    Smoking status: Former Smoker     Types: Cigarettes    Smokeless tobacco: Never Used   Substance Use Topics    Alcohol use: Yes     Comment: occasional      Current Outpatient Medications   Medication Sig Dispense Refill    Multiple Vitamins-Minerals (THERAPEUTIC MULTIVITAMIN-MINERALS) tablet Take 1 tablet by mouth daily      PARoxetine (PAXIL) 10 MG tablet Take 10 mg by mouth every morning       No current facility-administered medications for this visit.       No Known Allergies  Health Maintenance   Topic Date Due    HIV screen  09/19/1993    Lipid screen  09/19/2018    Flu vaccine (1) 09/01/2020    DTaP/Tdap/Td vaccine (2 - Td) 07/27/2029    Hepatitis A vaccine  Aged Out    Hepatitis B vaccine  Aged Out    Hib vaccine  Aged Out    Meningococcal (ACWY) vaccine  Aged Out    Pneumococcal 0-64 years Vaccine  Aged Out       Subjective:  Review of Systems   Constitutional: Positive for fatigue. HENT: Negative for facial swelling and postnasal drip. Eyes: Negative for photophobia. Respiratory: Positive for chest tightness and shortness of breath. Cardiovascular: Positive for chest pain. Negative for palpitations. Gastrointestinal: Negative for abdominal distention, abdominal pain, anal bleeding, blood in stool, constipation, diarrhea, nausea, rectal pain and vomiting. Endocrine: Negative for polyphagia. Genitourinary: Negative for dysuria, hematuria and urgency. Musculoskeletal: Negative for arthralgias, back pain, gait problem, joint swelling, myalgias, neck pain and neck stiffness. Neurological: Negative for dizziness, syncope and light-headedness. Psychiatric/Behavioral: Negative for behavioral problems, confusion, decreased concentration, dysphoric mood and hallucinations. The patient is not nervous/anxious and is not hyperactive. Objective:  Physical Exam  HENT:      Head: Normocephalic. Nose: Nose normal.   Eyes:      Extraocular Movements: Extraocular movements intact. Pupils: Pupils are equal, round, and reactive to light. Neck:      Musculoskeletal: Normal range of motion. Cardiovascular:      Rate and Rhythm: Normal rate and regular rhythm. Pulses: Normal pulses. Heart sounds: Normal heart sounds. No murmur. No friction rub. No gallop. Pulmonary:      Effort: No respiratory distress. Breath sounds: No stridor. No wheezing, rhonchi or rales. Chest:      Chest wall: No tenderness. Abdominal:      General: There is no distension.       Palpations: There is no mass. Tenderness: There is no abdominal tenderness. There is no right CVA tenderness, left CVA tenderness, guarding or rebound. Hernia: No hernia is present. Musculoskeletal:         General: No swelling, tenderness, deformity or signs of injury. Right lower leg: No edema. Left lower leg: No edema. Skin:     Coloration: Skin is not jaundiced or pale. Findings: No bruising, erythema, lesion or rash. Neurological:      General: No focal deficit present. Mental Status: He is alert and oriented to person, place, and time. Cranial Nerves: No cranial nerve deficit. Sensory: No sensory deficit. Motor: No weakness. Coordination: Coordination normal.      Gait: Gait normal.      Deep Tendon Reflexes: Reflexes normal.   Psychiatric:         Mood and Affect: Mood normal.         Thought Content: Thought content normal.       /80   Pulse 68   Ht 5' 8\" (1.727 m)   Wt 183 lb 3.2 oz (83.1 kg)   BMI 27.86 kg/m²     Assessment:      Diagnosis Orders   1. Precordial pain     2. Shortness of breath     risk for CAD  Chest pain as above  Recurrent symptoms  Cant exclude Gi causes  ECG non specific     Plan:  No follow-ups on file. Discussed  Non invasive cardiac testing will be ordered to further evaluate for any ischemic or structural heart disease as a cause of the patient symptoms. We will proceed with a Stress Cardiolite test and echo soon. Refer back to PCP if no findings  Continue risk factor modification and medical management  Thank you for allowing me to participate in the care of your patient. Please don't hesitate to contact me regarding any further issues related to the patient care    Orders Placed:  No orders of the defined types were placed in this encounter. Medications Prescribed:  No orders of the defined types were placed in this encounter. Discussed use, benefit, and side effects of prescribed medications.  All patient

## 2020-07-16 NOTE — TELEPHONE ENCOUNTER
Spoke with the patient  He is scheduled on 08.05.2020  Echo at 1045am   Stress 1pm.     Patient was given instructions at time of visit.

## 2020-08-05 ENCOUNTER — HOSPITAL ENCOUNTER (OUTPATIENT)
Dept: NON INVASIVE DIAGNOSTICS | Age: 40
Discharge: HOME | End: 2020-08-05
Payer: COMMERCIAL

## 2020-08-05 LAB
LV EF: 60 %
LV EF: 67 %
LVEF MODALITY: NORMAL
LVEF MODALITY: NORMAL

## 2020-08-05 PROCEDURE — 3430000000 HC RX DIAGNOSTIC RADIOPHARMACEUTICAL: Performed by: NUCLEAR MEDICINE

## 2020-08-05 PROCEDURE — A9500 TC99M SESTAMIBI: HCPCS | Performed by: NUCLEAR MEDICINE

## 2020-08-05 PROCEDURE — 78452 HT MUSCLE IMAGE SPECT MULT: CPT

## 2020-08-05 PROCEDURE — 93017 CV STRESS TEST TRACING ONLY: CPT

## 2020-08-05 PROCEDURE — 93306 TTE W/DOPPLER COMPLETE: CPT

## 2020-08-05 RX ADMIN — Medication 9.4 MILLICURIE: at 12:25

## 2020-08-05 RX ADMIN — Medication 30.6 MILLICURIE: at 13:45

## 2020-10-05 ENCOUNTER — HOSPITAL ENCOUNTER (EMERGENCY)
Age: 40
Discharge: HOME | End: 2020-10-05
Payer: COMMERCIAL

## 2020-10-05 VITALS
DIASTOLIC BLOOD PRESSURE: 89 MMHG | WEIGHT: 185 LBS | TEMPERATURE: 97.7 F | HEART RATE: 90 BPM | RESPIRATION RATE: 16 BRPM | OXYGEN SATURATION: 95 % | SYSTOLIC BLOOD PRESSURE: 130 MMHG | BODY MASS INDEX: 28.13 KG/M2

## 2020-10-05 PROCEDURE — 99213 OFFICE O/P EST LOW 20 MIN: CPT | Performed by: NURSE PRACTITIONER

## 2020-10-05 PROCEDURE — 99212 OFFICE O/P EST SF 10 MIN: CPT

## 2020-10-05 ASSESSMENT — ENCOUNTER SYMPTOMS
COUGH: 1
VOMITING: 0
SHORTNESS OF BREATH: 0
NAUSEA: 0
SORE THROAT: 0

## 2020-10-05 ASSESSMENT — PAIN DESCRIPTION - FREQUENCY: FREQUENCY: CONTINUOUS

## 2020-10-05 ASSESSMENT — PAIN SCALES - GENERAL: PAINLEVEL_OUTOF10: 5

## 2020-10-05 ASSESSMENT — PAIN DESCRIPTION - DESCRIPTORS: DESCRIPTORS: ACHING

## 2020-10-05 ASSESSMENT — PAIN DESCRIPTION - PAIN TYPE: TYPE: ACUTE PAIN

## 2020-10-05 ASSESSMENT — PAIN DESCRIPTION - LOCATION: LOCATION: GENERALIZED

## 2020-10-05 NOTE — ED PROVIDER NOTES
[facility]  Urgent Care Encounter       CHIEF COMPLAINT       Chief Complaint   Patient presents with    Fever     chills body aches and headache  x 1 week       Nurses Notes reviewed and I agree except as noted in the HPI. HISTORY OF PRESENT ILLNESS   Nupur Nagel is a 41 y.o. male who presents for evaluation of fever, chills, body aches and headaches. Patient states that the symptoms have been ongoing for the past 7 to 8 days. He reports that initially he did have a cough but states that this has resolved. He states that his temperature has never been over 100 degrees and he denies any known sick exposures. He states that he has been taking NyQuil as needed. Patient denies any loss of smell or taste, or any congestion, sinus pressure, or sore throat. Patient states that his symptoms have significantly improved today he is relatively asymptomatic. The history is provided by the patient. REVIEW OF SYSTEMS     Review of Systems   Constitutional: Positive for chills and fever. HENT: Negative for congestion and sore throat. Respiratory: Positive for cough. Negative for shortness of breath. Cardiovascular: Negative for chest pain. Gastrointestinal: Negative for nausea and vomiting. Musculoskeletal: Positive for myalgias. Negative for arthralgias. Skin: Negative for rash. Allergic/Immunologic: Negative for environmental allergies. Neurological: Positive for headaches. PAST MEDICAL HISTORY         Diagnosis Date    Anxiety        SURGICALHISTORY     Patient  has a past surgical history that includes pr lap,appendectomy (N/A, 1/20/2018) and hernia repair (Left, 12/19/2018). CURRENT MEDICATIONS       Previous Medications    MULTIPLE VITAMINS-MINERALS (THERAPEUTIC MULTIVITAMIN-MINERALS) TABLET    Take 1 tablet by mouth daily       ALLERGIES     Patient is has No Known Allergies.     Patients   There is no immunization history on file for this patient. FAMILY HISTORY     Patient's family history includes Diabetes in his mother; Lysle Hashimoto in his paternal grandmother; Stroke in his father. SOCIAL HISTORY     Patient  reports that he has quit smoking. His smoking use included cigarettes. He has never used smokeless tobacco. He reports current alcohol use. He reports that he does not use drugs. PHYSICAL EXAM     ED TRIAGE VITALS  BP: 130/89, Temp: 97.7 °F (36.5 °C), Pulse: 90, Resp: 16, SpO2: 95 %,Estimated body mass index is 28.13 kg/m² as calculated from the following:    Height as of 7/16/20: 5' 8\" (1.727 m). Weight as of this encounter: 185 lb (83.9 kg). ,No LMP for male patient. Physical Exam  Vitals signs and nursing note reviewed. Constitutional:       General: He is not in acute distress. Appearance: He is well-developed. He is not diaphoretic. HENT:      Right Ear: Tympanic membrane and ear canal normal.      Left Ear: Tympanic membrane and ear canal normal.      Mouth/Throat:      Mouth: Mucous membranes are moist.      Pharynx: Posterior oropharyngeal erythema (mild) present. No pharyngeal swelling. Tonsils: No tonsillar exudate. 1+ on the right. 1+ on the left. Eyes:      Conjunctiva/sclera:      Right eye: Right conjunctiva is not injected. Left eye: Left conjunctiva is not injected. Pupils: Pupils are equal.   Neck:      Musculoskeletal: Normal range of motion. Cardiovascular:      Rate and Rhythm: Normal rate and regular rhythm. Heart sounds: No murmur. Pulmonary:      Effort: Pulmonary effort is normal. No respiratory distress. Breath sounds: Normal breath sounds. Musculoskeletal:      Right knee: He exhibits normal range of motion. Left knee: He exhibits normal range of motion. Lymphadenopathy:      Head:      Right side of head: No tonsillar adenopathy. Left side of head: No tonsillar adenopathy. Cervical: No cervical adenopathy. Skin:     General: Skin is warm. Findings: No rash. Neurological:      Mental Status: He is alert and oriented to person, place, and time. Psychiatric:         Behavior: Behavior normal.         DIAGNOSTIC RESULTS     Labs:No results found for this visit on 10/05/20. IMAGING:    No orders to display         EKG:  none    URGENT CARE COURSE:     Vitals:    10/05/20 0814   BP: 130/89   Pulse: 90   Resp: 16   Temp: 97.7 °F (36.5 °C)   TempSrc: Temporal   SpO2: 95%   Weight: 185 lb (83.9 kg)       Medications - No data to display         PROCEDURES:  None    FINAL IMPRESSION      1. Upper respiratory tract infection, unspecified type          DISPOSITION/ PLAN       Discussed with the patient that he would meet criteria for a COVID swab, however patient states that he would prefer to not be tested at this time. I did discuss with the patient that based on the length of his symptoms he would be outside of his 10-day quarantine even if he would be positive in another 2 days and discussed the plan to give him a work note so that he may stay at home and rest and as long as he is asymptomatic he may return to normal activities. He is advised use Tylenol, ibuprofen, and rehydrate at home and he is agreeable to plan as discussed.     PATIENT REFERRED TO:  Jose Blue MD  [address] [facility] [id number]      DISCHARGE MEDICATIONS:  New Prescriptions    No medications on file       Discontinued Medications    PAROXETINE (PAXIL) 10 MG TABLET    Take 10 mg by mouth every morning       Current Discharge Medication List          GAYLE Alvarez CNP    (Please note that portions of this note were completed with a voice recognition program. Efforts were made to edit the dictations but occasionally words are mis-transcribed.)           GAYLE Alvarez CNP  10/05/20 3876

## 2020-10-05 NOTE — ED NOTES
Pt. Released in stable condition, ambulated per self to private car. Instructed pt to follow-up with family doctor as needed for recheck or go directly to the emergency department for any concerns/worsening conditions. Pt. Verbalized understanding of instructions. No questions at this time.       Marko Castro RN  10/05/20 5261

## 2021-01-26 ENCOUNTER — TELEPHONE (OUTPATIENT)
Dept: CARDIOLOGY CLINIC | Age: 41
End: 2021-01-26

## 2021-06-15 ENCOUNTER — APPOINTMENT (OUTPATIENT)
Dept: GENERAL RADIOLOGY | Age: 41
End: 2021-06-15
Payer: COMMERCIAL

## 2021-06-15 ENCOUNTER — HOSPITAL ENCOUNTER (EMERGENCY)
Age: 41
Discharge: HOME | End: 2021-06-15
Attending: EMERGENCY MEDICINE
Payer: COMMERCIAL

## 2021-06-15 VITALS
SYSTOLIC BLOOD PRESSURE: 140 MMHG | HEART RATE: 73 BPM | OXYGEN SATURATION: 98 % | RESPIRATION RATE: 12 BRPM | DIASTOLIC BLOOD PRESSURE: 90 MMHG | TEMPERATURE: 98.2 F
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
## 2021-06-15 DIAGNOSIS — S39.012A STRAIN OF LUMBAR REGION, INITIAL ENCOUNTER: ICD-10-CM

## 2021-06-15 PROCEDURE — 99283 EMERGENCY DEPT VISIT LOW MDM: CPT

## 2021-06-15 PROCEDURE — 72100 X-RAY EXAM L-S SPINE 2/3 VWS: CPT

## 2021-06-15 ASSESSMENT — ENCOUNTER SYMPTOMS
EYE PAIN: 0
ABDOMINAL DISTENTION: 0
ABDOMINAL PAIN: 0
BLOOD IN STOOL: 0
RHINORRHEA: 0
CHEST TIGHTNESS: 0
VOICE CHANGE: 0
WHEEZING: 0
TROUBLE SWALLOWING: 0
CHOKING: 0
NAUSEA: 0
PHOTOPHOBIA: 0
EYE REDNESS: 0
DIARRHEA: 0
EYE DISCHARGE: 0
EYE ITCHING: 0
SINUS PRESSURE: 0
CONSTIPATION: 0
BACK PAIN: 1
SHORTNESS OF BREATH: 0
COUGH: 0
VOMITING: 0
SORE THROAT: 0

## 2021-06-15 ASSESSMENT — PAIN DESCRIPTION - LOCATION: LOCATION: BACK

## 2021-06-15 ASSESSMENT — PAIN DESCRIPTION - PAIN TYPE: TYPE: ACUTE PAIN

## 2021-06-15 ASSESSMENT — PAIN DESCRIPTION - DESCRIPTORS: DESCRIPTORS: ACHING

## 2021-06-15 ASSESSMENT — PAIN DESCRIPTION - ORIENTATION: ORIENTATION: LOWER

## 2021-06-16 NOTE — ED NOTES
Assessment completed. Resp regular. Family at side. Pt ambulatory to room.       Natalie Oliva RN  06/15/21 6935
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
## 2023-12-09 ENCOUNTER — HOSPITAL ENCOUNTER (EMERGENCY)
Age: 43
Discharge: HOME | End: 2023-12-09
Payer: COMMERCIAL

## 2023-12-09 VITALS
RESPIRATION RATE: 16 BRPM | OXYGEN SATURATION: 96 % | TEMPERATURE: 98.3 F | BODY MASS INDEX: 28.28 KG/M2 | SYSTOLIC BLOOD PRESSURE: 136 MMHG | DIASTOLIC BLOOD PRESSURE: 95 MMHG | WEIGHT: 186 LBS | HEART RATE: 66 BPM

## 2023-12-09 DIAGNOSIS — J20.9 ACUTE BRONCHITIS, UNSPECIFIED ORGANISM: Primary | ICD-10-CM

## 2023-12-09 PROCEDURE — 99213 OFFICE O/P EST LOW 20 MIN: CPT

## 2023-12-09 RX ORDER — AZITHROMYCIN 250 MG/1
250 TABLET, FILM COATED ORAL SEE ADMIN INSTRUCTIONS
Qty: 6 TABLET | Refills: 0 | Status: SHIPPED | OUTPATIENT
Start: 2023-12-09 | End: 2023-12-14

## 2023-12-09 RX ORDER — ALBUTEROL SULFATE 90 UG/1
2 AEROSOL, METERED RESPIRATORY (INHALATION) EVERY 4 HOURS PRN
Qty: 6.7 G | Refills: 0 | Status: SHIPPED | OUTPATIENT
Start: 2023-12-09

## 2023-12-09 RX ORDER — BENZONATATE 100 MG/1
200 CAPSULE ORAL 3 TIMES DAILY PRN
Qty: 20 CAPSULE | Refills: 0 | Status: SHIPPED | OUTPATIENT
Start: 2023-12-09 | End: 2023-12-16

## 2023-12-09 RX ORDER — DEXTROMETHORPHAN HYDROBROMIDE AND PROMETHAZINE HYDROCHLORIDE 15; 6.25 MG/5ML; MG/5ML
5 SYRUP ORAL 4 TIMES DAILY PRN
Qty: 118 ML | Refills: 0 | Status: SHIPPED | OUTPATIENT
Start: 2023-12-09 | End: 2023-12-16

## 2023-12-09 RX ORDER — PREDNISONE 20 MG/1
40 TABLET ORAL DAILY
Qty: 10 TABLET | Refills: 0 | Status: SHIPPED | OUTPATIENT
Start: 2023-12-09 | End: 2023-12-14

## 2023-12-09 ASSESSMENT — ENCOUNTER SYMPTOMS
ABDOMINAL PAIN: 0
SINUS PRESSURE: 0
NAUSEA: 0
VOMITING: 0
DIARRHEA: 0
COUGH: 1
SORE THROAT: 0
WHEEZING: 1
SHORTNESS OF BREATH: 0
SINUS PAIN: 0
CHEST TIGHTNESS: 0

## 2023-12-09 ASSESSMENT — PAIN - FUNCTIONAL ASSESSMENT: PAIN_FUNCTIONAL_ASSESSMENT: NONE - DENIES PAIN

## 2023-12-09 NOTE — ED TRIAGE NOTES
Evelyne Cazares arrives to room with complaint of  productive cough  symptoms started 2.5 weeks ago.

## 2024-12-09 ENCOUNTER — HOSPITAL ENCOUNTER (EMERGENCY)
Age: 44
Discharge: HOME | End: 2024-12-09
Payer: COMMERCIAL

## 2024-12-09 VITALS
WEIGHT: 180 LBS | HEART RATE: 80 BPM | HEIGHT: 68 IN | RESPIRATION RATE: 16 BRPM | SYSTOLIC BLOOD PRESSURE: 118 MMHG | BODY MASS INDEX: 27.28 KG/M2 | TEMPERATURE: 99.1 F | DIASTOLIC BLOOD PRESSURE: 80 MMHG | OXYGEN SATURATION: 95 %

## 2024-12-09 DIAGNOSIS — J32.0 CHRONIC MAXILLARY SINUSITIS: Primary | ICD-10-CM

## 2024-12-09 PROCEDURE — 99213 OFFICE O/P EST LOW 20 MIN: CPT | Performed by: NURSE PRACTITIONER

## 2024-12-09 PROCEDURE — 99213 OFFICE O/P EST LOW 20 MIN: CPT

## 2024-12-09 RX ORDER — PREDNISONE 20 MG/1
20 TABLET ORAL 2 TIMES DAILY
Qty: 10 TABLET | Refills: 0 | Status: SHIPPED | OUTPATIENT
Start: 2024-12-09 | End: 2024-12-14

## 2024-12-09 RX ORDER — AZELASTINE 1 MG/ML
1 SPRAY, METERED NASAL 2 TIMES DAILY
Qty: 30 ML | Refills: 0 | Status: SHIPPED | OUTPATIENT
Start: 2024-12-09

## 2024-12-09 ASSESSMENT — ENCOUNTER SYMPTOMS
APNEA: 0
RHINORRHEA: 1
COUGH: 1
SHORTNESS OF BREATH: 0
STRIDOR: 0
SORE THROAT: 1
CHEST TIGHTNESS: 0
WHEEZING: 0
CHOKING: 0
SINUS PRESSURE: 1
EYE DISCHARGE: 0
VOICE CHANGE: 1
SINUS CONGESTION: 1

## 2024-12-09 ASSESSMENT — PAIN SCALES - GENERAL: PAINLEVEL_OUTOF10: 0

## 2024-12-09 ASSESSMENT — PAIN - FUNCTIONAL ASSESSMENT: PAIN_FUNCTIONAL_ASSESSMENT: 0-10

## 2024-12-09 NOTE — DISCHARGE INSTRUCTIONS
According to the Journal of Family Practice, “Daily hypertonic saline nasal irrigation improves quality of life, decreases symptoms and decreases medication use in patients with frequent sinusitis.”  The [facility] identified mold and fungi as leading causes of sinusitis. And Alkalol’s blend of naturally antiseptic ingredients has been shown in independent laboratory tests to clear out bacteria and inhibit the growth mold and fungi. Alkalol’s special mix of salts are hypertonic, resulting in greater pressure to clear nasal passages. This type of saline is recommended in many recent medical studies.

## 2024-12-09 NOTE — ED PROVIDER NOTES
[facility]RG[facility]  Urgent Care Encounter      CHIEF COMPLAINT       Chief Complaint   Patient presents with    Cough     Cough onset 11/28/24       Nurses Notes reviewed and I agree except as noted in the HPI.  HISTORY OFPRESENT ILLNESS   Surendra Overton is a 44 y.o.  The history is provided by the patient. No  was used.   Cough  Cough characteristics:  Productive  Sputum characteristics:  Yellow  Severity:  Severe  Onset quality:  Gradual  Duration:  3 weeks  Timing:  Constant  Progression:  Waxing and waning  Chronicity:  New  Smoker: no    Context: upper respiratory infection and weather changes    Context: not animal exposure, not exposure to allergens, not fumes, not occupational exposure, not sick contacts, not smoke exposure and not with activity    Relieved by:  Nothing  Worsened by:  Activity  Ineffective treatments:  Fluids, steam, rest and cough suppressants  Associated symptoms: headaches, rhinorrhea, sinus congestion and sore throat    Associated symptoms: no chest pain, no chills, no diaphoresis, no ear fullness, no ear pain, no eye discharge, no fever, no myalgias, no rash, no shortness of breath, no weight loss and no wheezing    Risk factors: no chemical exposure, no recent infection and no recent travel        REVIEW OF SYSTEMS     Review of Systems   Constitutional:  Positive for activity change, appetite change and fatigue. Negative for chills, diaphoresis, fever and weight loss.   HENT:  Positive for congestion, postnasal drip, rhinorrhea, sinus pressure, sore throat and voice change. Negative for ear pain.    Eyes:  Negative for discharge.   Respiratory:  Positive for cough. Negative for apnea, choking, chest tightness, shortness of breath, wheezing and stridor.    Cardiovascular:  Negative for chest pain, palpitations and leg swelling.   Musculoskeletal:  Negative for myalgias.   Skin:  Negative for rash.   Neurological:  Positive for headaches. Negative  for dizziness and light-headedness.       PAST MEDICAL HISTORY         Diagnosis Date    Anxiety     Hypertension        SURGICAL HISTORY     Patient  has a past surgical history that includes pr laparoscopic appendectomy (N/A, 1/20/2018) and hernia repair (Left, 12/19/2018).    CURRENT MEDICATIONS       Discharge Medication List as of 12/9/2024  9:29 AM        CONTINUE these medications which have NOT CHANGED    Details   LOSARTAN POTASSIUM PO Take 1 tablet by mouth dailyHistorical Med      albuterol sulfate HFA (PROVENTIL;VENTOLIN;PROAIR) 108 (90 Base) MCG/ACT inhaler Inhale 2 puffs into the lungs every 4 hours as needed for Wheezing, Disp-6.7 g, R-0Normal      Multiple Vitamins-Minerals (THERAPEUTIC MULTIVITAMIN-MINERALS) tablet Take 1 tablet by mouth dailyHistorical Med             ALLERGIES     Patient is has No Known Allergies.    FAMILY HISTORY     Patient's family history includes Diabetes in his mother; Lung Cancer in his paternal grandmother; Stroke in his father.    SOCIAL HISTORY     Patient  reports that he has quit smoking. His smoking use included cigarettes. He has never used smokeless tobacco. He reports current alcohol use. He reports that he does not use drugs.    PHYSICAL EXAM     ED TRIAGE VITALS  BP: 118/80, Temp: 99.1 °F (37.3 °C), Pulse: 80, Respirations: 16, SpO2: 95 %  Physical Exam  Vitals and nursing note reviewed.   Constitutional:       General: He is not in acute distress.     Appearance: Normal appearance. He is normal weight. He is not ill-appearing, toxic-appearing or diaphoretic.   HENT:      Head: Normocephalic.      Right Ear: Ear canal and external ear normal.      Left Ear: Ear canal and external ear normal.      Nose: Congestion and rhinorrhea present.      Mouth/Throat:      Mouth: Mucous membranes are moist.      Pharynx: Posterior oropharyngeal erythema present.   Eyes:      Extraocular Movements: Extraocular movements intact.      Conjunctiva/sclera: Conjunctivae normal.

## 2024-12-16 ENCOUNTER — HOSPITAL ENCOUNTER (EMERGENCY)
Age: 44
Discharge: HOME | End: 2024-12-16
Payer: COMMERCIAL

## 2024-12-16 VITALS
RESPIRATION RATE: 20 BRPM | TEMPERATURE: 98.2 F | HEART RATE: 89 BPM | SYSTOLIC BLOOD PRESSURE: 165 MMHG | OXYGEN SATURATION: 98 % | DIASTOLIC BLOOD PRESSURE: 108 MMHG

## 2024-12-16 DIAGNOSIS — J18.9 PNEUMONIA DUE TO INFECTIOUS ORGANISM, UNSPECIFIED LATERALITY, UNSPECIFIED PART OF LUNG: Primary | ICD-10-CM

## 2024-12-16 PROCEDURE — 99213 OFFICE O/P EST LOW 20 MIN: CPT | Performed by: NURSE PRACTITIONER

## 2024-12-16 PROCEDURE — 99213 OFFICE O/P EST LOW 20 MIN: CPT

## 2024-12-16 RX ORDER — AZITHROMYCIN 250 MG/1
TABLET, FILM COATED ORAL
Qty: 6 TABLET | Refills: 0 | Status: SHIPPED | OUTPATIENT
Start: 2024-12-16 | End: 2024-12-26

## 2024-12-16 ASSESSMENT — PAIN - FUNCTIONAL ASSESSMENT: PAIN_FUNCTIONAL_ASSESSMENT: NONE - DENIES PAIN

## 2024-12-16 ASSESSMENT — ENCOUNTER SYMPTOMS
COUGH: 1
WHEEZING: 1

## 2024-12-16 NOTE — ED TRIAGE NOTES
To room with c/o cough since last Monday. Tx for sinusitis with amoxil and prednisone. He is now wheezing. Son has pneumonia.

## 2024-12-16 NOTE — ED PROVIDER NOTES
[facility] URGENT CARE  UrgentCare Encounter      CHIEFCOMPLAINT       Chief Complaint   Patient presents with    Cough       Nurses Notes reviewed and I agree except as noted in the HPI.  HISTORY OF PRESENT ILLNESS   Surendra Overton is a 44 y.o. male who presents to urgent care with complaints of cough, wheezing.  Patient states that he was seen last week started on Augmentin and prednisone.  He has completed these medications.  He states that his symptoms improved but have not gone away completely.  His son was recently diagnosed with pneumonia on Saturday and he is concerned that he may have the same thing is him and the medication is not covering it.    REVIEW OF SYSTEMS     Review of Systems   Respiratory:  Positive for cough and wheezing.        PAST MEDICAL HISTORY         Diagnosis Date    Anxiety     Hypertension        SURGICAL HISTORY     Patient  has a past surgical history that includes pr laparoscopic appendectomy (N/A, 1/20/2018) and hernia repair (Left, 12/19/2018).    CURRENT MEDICATIONS       Discharge Medication List as of 12/16/2024 12:10 PM        CONTINUE these medications which have NOT CHANGED    Details   LOSARTAN POTASSIUM PO Take 1 tablet by mouth dailyHistorical Med      amoxicillin-clavulanate (AUGMENTIN) 875-125 MG per tablet Take 1 tablet by mouth 2 times daily for 7 days, Disp-14 tablet, R-0Normal      azelastine (ASTELIN) 0.1 % nasal spray 1 spray by Nasal route 2 times daily Use in each nostril as directed, Disp-30 mL, R-0Normal      Multiple Vitamins-Minerals (THERAPEUTIC MULTIVITAMIN-MINERALS) tablet Take 1 tablet by mouth dailyHistorical Med             ALLERGIES     Patient is has No Known Allergies.    FAMILY HISTORY     Patient'sfamily history includes Diabetes in his mother; Lung Cancer in his paternal grandmother; Stroke in his father.    SOCIAL HISTORY     Patient  reports that he has quit smoking. His smoking use included cigarettes. He has never used  smokeless tobacco. He reports current alcohol use. He reports that he does not use drugs.    PHYSICAL EXAM     ED TRIAGE VITALS  BP: (!) 165/108 (took losartin this am.), Temp: 98.2 °F (36.8 °C), Pulse: 89, Respirations: 20, SpO2: 98 %  Physical Exam  Constitutional:       General: He is not in acute distress.     Appearance: He is well-developed. He is not ill-appearing or toxic-appearing.   HENT:      Head: Normocephalic and atraumatic.      Right Ear: Tympanic membrane normal.      Left Ear: Tympanic membrane normal.      Nose: No congestion or rhinorrhea.      Mouth/Throat:      Mouth: Mucous membranes are moist.      Pharynx: Oropharynx is clear. No pharyngeal swelling, posterior oropharyngeal erythema or uvula swelling.      Tonsils: 0 on the right. 0 on the left.   Eyes:      Conjunctiva/sclera: Conjunctivae normal.      Pupils: Pupils are equal, round, and reactive to light.   Cardiovascular:      Rate and Rhythm: Normal rate and regular rhythm.      Heart sounds: No murmur heard.  Pulmonary:      Effort: Pulmonary effort is normal.      Breath sounds: Rhonchi present. No wheezing.      Comments: Faint rhonchi to the left upper lobe  Abdominal:      General: Bowel sounds are normal.      Palpations: Abdomen is soft.      Tenderness: There is no abdominal tenderness.   Musculoskeletal:      Cervical back: Normal range of motion and neck supple.   Lymphadenopathy:      Cervical: No cervical adenopathy.   Skin:     General: Skin is warm and dry.      Capillary Refill: Capillary refill takes less than 2 seconds.   Neurological:      General: No focal deficit present.      Mental Status: He is alert and oriented to person, place, and time.   Psychiatric:         Mood and Affect: Mood normal.         Behavior: Behavior normal.         DIAGNOSTIC RESULTS   Labs:No results found for this visit on 12/16/24.    IMAGING:  No orders to display     URGENT CARE COURSE:         Medications - No data to  (4) walks frequently

## (undated) DEVICE — SUTURE VCRL SZ 4-0 L27IN ABSRB UD L19MM FS-2 3/8 CIR REV J422H

## (undated) DEVICE — SOLUTION IV IRRIG WATER 1000ML POUR BRL 2F7114

## (undated) DEVICE — CANNULA SEAL

## (undated) DEVICE — 3M™ TRANSPORE™ WHITE SURGICAL TAPE 1534-2, 2 INCH X 10 YARD (5CM X 9,1M), 6 ROLLS/CARTON 10 CARTONS/CASE: Brand: 3M™ TRANSPORE™

## (undated) DEVICE — BAG 3X6 DETACH NYL SPEC

## (undated) DEVICE — SOLUTION ANTIFOG VIS SYS CLEARIFY LAPSCP

## (undated) DEVICE — 3M™ WARMING BLANKET, UPPER BODY, 10 PER CASE, 42268: Brand: BAIR HUGGER™

## (undated) DEVICE — TROCAR: Brand: KII SHIELDED BLADED ACCESS SYSTEM

## (undated) DEVICE — GLOVE ORANGE PI 7   MSG9070

## (undated) DEVICE — CUTTER ENDOSCP L340MM LIN ARTC SGL STROKE FIRING ENDOPATH

## (undated) DEVICE — ARM DRAPE

## (undated) DEVICE — CORE TRUMPET FOR SINGLE SOLUTION BAG: Brand: CORE DYNAMICS

## (undated) DEVICE — BLADELESS OBTURATOR: Brand: WECK VISTA

## (undated) DEVICE — EXCEL 10FT (3.05 M) INSUFFLATION TUBING SET WITH 0.1 MICRON FILTER: Brand: EXCEL

## (undated) DEVICE — BLADE CLIPPER GEN PURP NS

## (undated) DEVICE — GLOVE SURG SZ 65 THK91MIL LTX FREE SYN POLYISOPRENE

## (undated) DEVICE — UNIVERSAL MONOPOLAR LAPAROSCOPIC CABLE 10FT, 4MM PIN CONNECTOR: Brand: CONMED

## (undated) DEVICE — TUBING, SUCTION, 1/4" X 20', STRAIGHT: Brand: MEDLINE INDUSTRIES, INC.

## (undated) DEVICE — SUTURE V-LOC 180 SZ 3-0 L9IN ABSRB GRN L26MM V-20 1/2 CIR VLOCL0644

## (undated) DEVICE — GOWN,SIRUS,NON REINFRCD,LARGE,SET IN SL: Brand: MEDLINE

## (undated) DEVICE — GARMENT COMPR STD FOR 17IN CALF UNIF THER FLOTRN

## (undated) DEVICE — [HIGH FLOW INSUFFLATOR,  DO NOT USE IF PACKAGE IS DAMAGED,  KEEP DRY,  KEEP AWAY FROM SUNLIGHT,  PROTECT FROM HEAT AND RADIOACTIVE SOURCES.]: Brand: PNEUMOSURE

## (undated) DEVICE — BASIC SINGLE BASIN BTC-LF: Brand: MEDLINE INDUSTRIES, INC.

## (undated) DEVICE — AGENT HEMSTAT 3GM PURIFIED PLNT STARCH PWD ABSRB ARISTA AH

## (undated) DEVICE — INTENDED FOR TISSUE SEPARATION, AND OTHER PROCEDURES THAT REQUIRE A SHARP SURGICAL BLADE TO PUNCTURE OR CUT.: Brand: BARD-PARKER ® CARBON RIB-BACK BLADES

## (undated) DEVICE — POSITIONER HD W8XH4XL8.5IN RASPBERRY FOAM SLT

## (undated) DEVICE — COVER ARMBRD W13XL28.5IN IMPERV BLU FOR OP RM

## (undated) DEVICE — YANKAUER,BULB TIP,W/O VENT,RIGID,STERILE: Brand: MEDLINE

## (undated) DEVICE — TIP COVER ACCESSORY

## (undated) DEVICE — LINER SUCT CANSTR 1500CC SEMI RIG W/ POR HYDROPHOBIC SHUT

## (undated) DEVICE — 35 ML SYRINGE LUER-LOCK TIP: Brand: MONOJECT

## (undated) DEVICE — COLUMN DRAPE

## (undated) DEVICE — SKIN AFFIX SURG ADHESIVE 72/CS 0.55ML: Brand: MEDLINE

## (undated) DEVICE — COAGULATOR ELECSURG BLADE 10 FTX1 IN PTFE STRL ULTRACLEAN

## (undated) DEVICE — TROCARS: Brand: KII® BLUNT TIP ACCESS SYSTEM

## (undated) DEVICE — APPLICATOR SURG XL L38CM FOR ARISTA ABSRB HEMSTAT FLEXITIP

## (undated) DEVICE — GLOVE ORANGE PI 8   MSG9080

## (undated) DEVICE — ELECTRO LUBE IS A SINGLE PATIENT USE DEVICE THAT IS INTENDED TO BE USED ON ELECTROSURGICAL ELECTRODES TO REDUCE STICKING.: Brand: KEY SURGICAL ELECTRO LUBE